# Patient Record
Sex: FEMALE | Race: WHITE | NOT HISPANIC OR LATINO | Employment: PART TIME | URBAN - METROPOLITAN AREA
[De-identification: names, ages, dates, MRNs, and addresses within clinical notes are randomized per-mention and may not be internally consistent; named-entity substitution may affect disease eponyms.]

---
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
## 2018-01-12 VITALS
HEIGHT: 62 IN | DIASTOLIC BLOOD PRESSURE: 78 MMHG | BODY MASS INDEX: 23 KG/M2 | SYSTOLIC BLOOD PRESSURE: 118 MMHG | WEIGHT: 125 LBS

## 2018-01-13 VITALS
DIASTOLIC BLOOD PRESSURE: 73 MMHG | RESPIRATION RATE: 18 BRPM | WEIGHT: 123.25 LBS | HEART RATE: 69 BPM | HEIGHT: 62 IN | BODY MASS INDEX: 22.68 KG/M2 | SYSTOLIC BLOOD PRESSURE: 112 MMHG

## 2018-01-14 VITALS
HEIGHT: 62 IN | WEIGHT: 129 LBS | SYSTOLIC BLOOD PRESSURE: 110 MMHG | BODY MASS INDEX: 23.74 KG/M2 | DIASTOLIC BLOOD PRESSURE: 60 MMHG

## 2018-01-15 NOTE — PSYCH
Psych Med Mgmt    Appearance: was calm and cooperative and good eye contact   Dressed in casual clothing, sitting comfortably in chair, cooperative, well-related  Observed mood: "Pretty good" (rating 7/10 happiness)  Observed mood: Joselito Gutierrez Appears euthymic, stable, mood-congruent  Speech: a normal rate and fluent  Thought processes: coherent/organized  Hallucinations: no hallucinations present  Thought Content: no delusions  Abnormal Thoughts: The patient has no suicidal thoughts and no homicidal thoughts  Orientation: The patient is oriented to person, place and time  Recent and Remote Memory: short term memory intact and long term memory intact  Attention Span And Concentration: concentration intact  Insight: Insight intact  Judgment: Her judgment was impaired  Muscle Strength And Tone  Normal gait and station  Language:  Within normal limits  Fund of knowledge: Patient displays  Age-appropriate  The patient is experiencing no localized pain          Treatment Recommendations: 18-1 y/o  Female, domiciled with mother, brother [de-identified]15 y/o), sister (15 y/o) in [place], parents  for 8 years (shared custody), visits with dad about 2 days/week, currently in 12th grade at [address] [place] [address] (regular education, mostly A's and B's, about 5 close friends), PPH significant for h/o anxiety symptoms, currently in outpatient therapy over past year, no past psych hospitalizations, no past suicide attempts, no h/o self-injurious behaviors, no h/o physical aggression, no significant PMH, no active substance use, presents to [place] [address] outpatient clinic on referral from PCP for anxiety with patient reporting "I have social anxiety" and mother reporting "she ran off with her boyfriend about a couple of months ago "    On assessment today, patient with some improvement in anxiety symptoms but continues to get anxious in social situations, continues to have a couple of panic attacks per month, in psychosocial context of parent-child relational problems, cannabis use, being influenced by peer behavior  No current passive or active suicidal ideation, intent, or plan  Currently, patient is not an imminent risk of harm to self or others and is appropriate for outpatient level of care at this time  Plan:  1  Social Anxiety- Will continue titration of Zoloft to 75 mg daily for anxiety symptoms  Reviewed risks/benefits and side effects, including black box warning, mother and patient consent to medication at this time  Continue individual and family psychotherapy  2  Medical- No active medical issues  F/u with PCP for on-going medical care  3  F/u with this provider in 6 weeks  Risks, Benefits And Possible Side Effects Of Medications: Risks, benefits, and possible side effects of medications explained to patient and patient verbalizes understanding  She reports normal appetite, normal energy level and normal number of sleep hours  24-1 y/o  Female, domiciled with mother, brother [de-identified]15 y/o), sister (15 y/o) in [place], parents  for 8 years (shared custody), visits with dad about 2 days/week, currently in 12th grade at [address] [place] [address] (regular education, mostly A's and B's, about 5 close friends), PPH significant for h/o anxiety symptoms, currently in outpatient therapy over past year, no past psych hospitalizations, no past suicide attempts, no h/o self-injurious behaviors, no h/o physical aggression, no significant PMH, no active substance use, presents to [place] [id number] outpatient clinic on referral from PCP for anxiety with patient reporting "I have social anxiety" and mother reporting "she ran off with her boyfriend about a couple of months ago "    On problem-focused interview:  1  Social Anxiety D/o- Patient reports some improvement in symptoms since last visit   She reports occasionally forgetting to take the medication, maybe missing about 1 day per week of the medication  Patient reports school has been going pretty well, mainly getting A's and B's, getting a 61 in math  She reports unexpected changes in her regular schedule can cause some anxiety  Patient reports having a panic attack on 3 occasions over the past month, unable to identify a trigger  Patient reports working at [employer], became upset on her birthday and started crying a bit  Patient reports her mood has been "pretty good" overall (rating 7/10 happiness)  Patient reports feeling anxious about unexpected changes in schedule because she isn't sure who she is going to walk with or talk to, worries about social aspects of situations  Patient continues to go to therapy at least on a biweekly basis to address some anxiety symptoms  Patient reports deep breathing, playing on phone, trying to distract self helps with anxious thoughts  Patient reports continues to be in a relationship with her boyfriend  Denies any recent cannabis or alcohol use  Patient reports tolerating medication well without reported side effects  Patient reports sleeping well, denies any trouble falling or staying asleep, sleeping about 7 hours/night  Reports appetite, energy is fine  Denies any passive or active suicidal ideation, intent, or plan  Patient reports things have been calm between her and her parents  Reports plans to go to community [employer] next year  Medication and therapy helping with symptoms, relationship stressors main exacerbating factor  Collateral obtained from patient's mother  Mother reports patient has been pretty well  Mother reports patient smoked cannabis on one occasion over the past 6 weeks, reports patient has been working frequently recently  Patient reports that she smoked with friends, reports that it only occurred on one occasion  Mother reports patient also lying about getting a tattoo in terms of placement of tattoo and what type of tattoo   Patient reports lying about tattoo because she thought her mom "would flip out "  DSM    Provisional Diagnosis: 1  Social Anxiety Disorder  Assessment    1  Social anxiety disorder (300 23) (F40 10)    Plan    1  From  Sertraline HCl - 50 MG Oral Tablet Take half tab daily for 1 week, then   take 1 full tab daily To Sertraline HCl - 25 MG Oral Tablet TAKE 3 TABLET Daily    Review of Systems    Constitutional: No fever, no chills, feels well, no tiredness, no recent weight gain or loss  Cardiovascular: no complaints of slow or fast heart rate, no chest pain, no palpitations  Respiratory: no complaints of shortness of breath, no wheezing, no dyspnea on exertion  Gastrointestinal: no complaints of abdominal pain, no constipation, no nausea, no diarrhea, no vomiting  Genitourinary: no complaints of dysuria, no incontinence, no pelvic pain, no urinary frequency  Musculoskeletal: no complaints of arthralgia, no myalgias, no limb pain, no joint stiffness  Integumentary: no complaints of skin rash, no itching, no dry skin  Neurological: no complaints of headache, no confusion, no numbness, no dizziness  Past Psychiatric History    Past Psychiatric History: H/o anxiety symptoms, currently in outpatient therapy over past year, no past psych hospitalizations, no past suicide attempts, no h/o self-injurious behaviors, no h/o physical aggression  Currently in outpatient therapy at [facility], about 5-6 sessions so far  Past Medication: Celexa 20 mg daily (felt ineffective)      Substance Abuse Hx    Substance Abuse History: Alcohol- Started around [age] y/o, mainly in summers and in social situations, at greatest frequency about 1x/week, drinking about 2-3 shots, last drank a couple of months ago    Cannabis- Started around 11 y/o, mainly in the summers in social situations, at greatest frequency about 3x/week, last used within past 6 weeks, most recent drug testing was negative    No cigarettes  Denies other substance use   Patient reports mother started doing drug tests over past couple of months  Active Problems    1  Social anxiety disorder (300 23) (F40 10)    Past Medical History    1  No pertinent past medical history    The active problems and past medical history were reviewed and updated today  Allergies    1  No Known Drug Allergies    Current Meds   1  Depo-Provera 150 MG/ML Intramuscular Suspension; Therapy: (Recorded:[id number]) to Recorded   2  Sertraline HCl - 50 MG Oral Tablet; Take half tab daily for 1 week, then take 1 full tab daily; Therapy: [id number] to (Nadira Ying)  Requested for: 48Rhz4799; Last   Rx:28Ohr2873 Ordered    The medication list was reviewed and updated today  Family Psych History  Denies  No FH of suicide  Social History  The social history was reviewed and updated today  Domiciled with mother and 2 siblings in [place]  Currently in 12th grade  Reports closest support is best friend, closest support in family is mother  Reports okay relationship with step-father  Denies access to firearms  History Of Phys/Sex Abuse Or Perpetration    History Of Phys/Sex Abuse or Perpetration: Denies any h/o or physical or sexual abuse  End of Encounter Meds    1  Sertraline HCl - 25 MG Oral Tablet; TAKE 3 TABLET Daily; Therapy: 15HUR0050 to (Evaluate:03Jan2017)  Requested for: 85IFO8440; Last   Rx:04Nov2016 Ordered    2  Depo-Provera 150 MG/ML Intramuscular Suspension (MedroxyPROGESTERone   Acetate); Therapy: (Recorded:29Uzx6480) to Recorded    Signatures   Electronically signed by :  YASH Bazan ; Nov 4 2016  9:49AM EST                       (Author)
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
## 2018-01-15 NOTE — PSYCH
Behavioral Health Outpatient Intake    Referred By: [address] [place] [address],[address]  Intake Questions: there are no developmental disabilities  the patient does not have a hearing impairment  the patient does not have an ICM or CTT  patient is not taking injectable psychiatric medications  Employment: The patient is not employed  full time at [employer]  Emergency Contact Information:   Emergency Contact: Morgan Bryant   Relationship to Patient: MOTHER   Phone Number: [phone]   Previous Psychiatric Treatment: She has not been previously seen by a psychiatrist     She has previously been seen by a therapist  Susan Jiang   History: no  service  She has not had combat service  She was not activated into federal active duty as a member of the [employer] or [employer]  Minor Child: MOTHER has custody of the child  there is a custody agreement  Insurance Subscriber: Morgan Bryant   : [birth year]   Employer: [employer]   Primary Insurance: [id number]    Phone number: [phone]   ID number: [id number]   Group number: [id number]     Presenting Problem (in patient's words): ANXIETY, PCP SUGGESTED SPECIALIST  Substance Abuse: NONE  Previous Treatment: The patient has not been seen here in the past      Accepted as Patient   DR Loyd Hopedale 16 @ 10     Primary Care Physician: [address]   Electronically signed by : Boo Norwood, ; Sep 19 2016 11:33AM EST                       (Author)    Electronically signed by : Boo Norwood, ; Sep 19 2016 11:34AM EST                       (Author)

## 2018-01-22 VITALS — BODY MASS INDEX: 22.82 KG/M2 | WEIGHT: 124 LBS | HEIGHT: 62 IN

## 2018-02-08 ENCOUNTER — PROCEDURE VISIT (OUTPATIENT)
Dept: OBGYN CLINIC | Facility: CLINIC | Age: 18
End: 2018-02-08
Payer: COMMERCIAL

## 2018-02-08 VITALS
DIASTOLIC BLOOD PRESSURE: 78 MMHG | BODY MASS INDEX: 26.31 KG/M2 | HEIGHT: 62 IN | WEIGHT: 143 LBS | SYSTOLIC BLOOD PRESSURE: 118 MMHG

## 2018-02-08 DIAGNOSIS — Z30.46 NEXPLANON REMOVAL: Primary | ICD-10-CM

## 2018-02-08 DIAGNOSIS — Z30.011 ENCOUNTER FOR INITIAL PRESCRIPTION OF CONTRACEPTIVE PILLS: ICD-10-CM

## 2018-02-08 PROBLEM — N92.6 IRREGULAR MENSTRUAL BLEEDING: Status: ACTIVE | Noted: 2017-12-07

## 2018-02-08 PROBLEM — S83.006A DISLOCATION OF PATELLA: Status: ACTIVE | Noted: 2017-08-09

## 2018-02-08 PROCEDURE — 11982 REMOVE DRUG IMPLANT DEVICE: CPT | Performed by: PHYSICIAN ASSISTANT

## 2018-02-08 RX ORDER — NORETHINDRONE ACETATE AND ETHINYL ESTRADIOL 1MG-20(21)
1 KIT ORAL DAILY
Qty: 28 TABLET | Refills: 2 | Status: SHIPPED | OUTPATIENT
Start: 2018-02-08 | End: 2018-04-27 | Stop reason: ALTCHOICE

## 2018-02-08 NOTE — PROGRESS NOTES
Assessment/Plan:    Encounter for initial prescription of contraceptive pills  Reviewed birth control options including OCP, NuvaRing, Patch, Depo, Nexplanon  Patient decided on OCP  Reviewed when to start, what to do if misses pill  Recommend using condoms for the 1st month on the pill, if misses more than 2 pills in the pack, if on antibiotics and for STD prevention  Reviewed common side effects of the pill including nausea, vomiting, breast pain, bloating, fatigue, mood swings, weight gain, and increased acne  Reassured side effects typically diminish in the first month or two on the pill  Reviewed clotting risk and signs and symptoms of pulmonary embolism, DVT, myocardial infarction, stroke  Will trial Junel Fe 1/20  Will RTO in 3 months for pill check  Nexplanon removal  Nexplanon removed without difficulty  Problem List Items Addressed This Visit     Encounter for initial prescription of contraceptive pills     Reviewed birth control options including OCP, NuvaRing, Patch, Depo, Nexplanon  Patient decided on OCP  Reviewed when to start, what to do if misses pill  Recommend using condoms for the 1st month on the pill, if misses more than 2 pills in the pack, if on antibiotics and for STD prevention  Reviewed common side effects of the pill including nausea, vomiting, breast pain, bloating, fatigue, mood swings, weight gain, and increased acne  Reassured side effects typically diminish in the first month or two on the pill  Reviewed clotting risk and signs and symptoms of pulmonary embolism, DVT, myocardial infarction, stroke  Will trial Junel Fe 1/20  Will RTO in 3 months for pill check  Relevant Medications    norethindrone-ethinyl estradiol (JUNEL FE 1/20) 1-20 MG-MCG per tablet    Nexplanon removal - Primary     Nexplanon removed without difficulty  Subjective:      Patient ID: Charline Baker is a 21 y o  female      HPI  22 yo seen for Nexplanon removal  Patient had Nexplanon inserted 2017  Reports persistent vaginal bleeding that had not improved with OCP  At this time would like to go back on OCP  Tolerated well in the past was just forgetting to take it  The following portions of the patient's history were reviewed and updated as appropriate:   She  has a past medical history of Pregnancy  She  does not have any pertinent problems on file  She  has a past surgical history that includes [place] tooth extraction and Induced   Her family history includes Hypertension in her father  She  reports that she has never smoked  She does not have any smokeless tobacco history on file  She reports that she does not drink alcohol or use drugs  Current Outpatient Prescriptions   Medication Sig Dispense Refill    norethindrone-ethinyl estradiol (JUNEL FE 1/20) 1-20 MG-MCG per tablet Take 1 tablet by mouth daily for 84 days 28 tablet 2     No current facility-administered medications for this visit  She has No Known Allergies       Review of Systems   Constitutional: Negative for fatigue, fever and unexpected weight change  HENT: Negative for dental problem and sinus pressure  Eyes: Negative for visual disturbance  Respiratory: Negative for cough, shortness of breath and wheezing  Cardiovascular: Negative for chest pain  Gastrointestinal: Negative for abdominal pain, blood in stool, constipation, diarrhea, nausea and vomiting  Endocrine: Negative for polydipsia  Genitourinary: Negative for difficulty urinating, dyspareunia, dysuria, frequency, hematuria, pelvic pain and urgency  Musculoskeletal: Negative for arthralgias and back pain  Neurological: Negative for dizziness, seizures, light-headedness and headaches  Psychiatric/Behavioral: Negative for suicidal ideas  The patient is not nervous/anxious  Objective:     Physical Exam   Constitutional: She is oriented to person, place, and time   She appears well-developed and well-nourished  Neck: No thyroid mass present  Cardiovascular: Normal rate, regular rhythm and normal heart sounds  Exam reveals no gallop and no friction rub  No murmur heard  Pulmonary/Chest: Effort normal and breath sounds normal  No respiratory distress  She has no wheezes  She has no rales  Abdominal: Normal appearance  Neurological: She is alert and oriented to person, place, and time  Skin: Skin is warm and dry  No rash noted  No erythema  No pallor  Psychiatric: She has a normal mood and affect   Her behavior is normal  Judgment and thought content normal      Remove and insert drug implant  Date/Time: 2/8/2018 8:15 AM  Performed by: Dov Parry  Authorized by: Dov Parry     Consent:     Consent obtained:  Verbal    Consent given by:  Patient    Procedural risks discussed:  Bleeding and infection    Patient questions answered: yes      Patient agrees, verbalizes understanding, and wants to proceed: yes    Indication:     Indication: Presence of non-biodegradable drug delivery implant    Pre-procedure:     Prepped with: alcohol 70% and povidone-iodine      Local anesthetic:  Lidocaine 1%    The site was cleaned and prepped in a sterile fashion: yes    Procedure:     Procedure:  Removal    Small stab incision was made in arm: yes      Left/right:  Left    Visualization of implant was obtained: yes      Site was closed with steri-strips and pressure bandage applied: yes

## 2018-02-08 NOTE — ASSESSMENT & PLAN NOTE
Reviewed birth control options including OCP, NuvaRing, Patch, Depo, Nexplanon  Patient decided on OCP  Reviewed when to start, what to do if misses pill  Recommend using condoms for the 1st month on the pill, if misses more than 2 pills in the pack, if on antibiotics and for STD prevention  Reviewed common side effects of the pill including nausea, vomiting, breast pain, bloating, fatigue, mood swings, weight gain, and increased acne  Reassured side effects typically diminish in the first month or two on the pill  Reviewed clotting risk and signs and symptoms of pulmonary embolism, DVT, myocardial infarction, stroke  Will trial Junel Fe 1/20  Will RTO in 3 months for pill check

## 2018-02-11 ENCOUNTER — HOSPITAL ENCOUNTER (EMERGENCY)
Facility: HOSPITAL | Age: 18
Discharge: HOME | End: 2018-02-11
Attending: EMERGENCY MEDICINE | Admitting: EMERGENCY MEDICINE
Payer: COMMERCIAL

## 2018-02-11 VITALS
BODY MASS INDEX: 25.04 KG/M2 | OXYGEN SATURATION: 100 % | RESPIRATION RATE: 16 BRPM | HEIGHT: 63 IN | HEART RATE: 86 BPM | WEIGHT: 141.31 LBS | TEMPERATURE: 98.6 F | SYSTOLIC BLOOD PRESSURE: 146 MMHG | DIASTOLIC BLOOD PRESSURE: 75 MMHG

## 2018-02-11 DIAGNOSIS — R10.9 ABDOMINAL PAIN: Primary | ICD-10-CM

## 2018-02-11 DIAGNOSIS — K58.9 IBS (IRRITABLE BOWEL SYNDROME): ICD-10-CM

## 2018-02-11 LAB
ALBUMIN SERPL BCP-MCNC: 4.6 G/DL (ref 3.5–5)
ALP SERPL-CCNC: 132 U/L (ref 46–384)
ALT SERPL W P-5'-P-CCNC: 34 U/L (ref 12–78)
ANION GAP SERPL CALCULATED.3IONS-SCNC: 12 MMOL/L (ref 4–13)
AST SERPL W P-5'-P-CCNC: 15 U/L (ref 5–45)
BASOPHILS # BLD AUTO: 0.02 THOUSANDS/ΜL (ref 0–0.1)
BASOPHILS NFR BLD AUTO: 0 % (ref 0–1)
BILIRUB SERPL-MCNC: 1 MG/DL (ref 0.2–1)
BUN SERPL-MCNC: 20 MG/DL (ref 5–25)
CALCIUM SERPL-MCNC: 9.7 MG/DL (ref 8.3–10.1)
CHLORIDE SERPL-SCNC: 104 MMOL/L (ref 100–108)
CLARITY, POC: CLEAR
CO2 SERPL-SCNC: 25 MMOL/L (ref 21–32)
COLOR, POC: YELLOW
CREAT SERPL-MCNC: 0.78 MG/DL (ref 0.6–1.3)
EOSINOPHIL # BLD AUTO: 0.08 THOUSAND/ΜL (ref 0–0.61)
EOSINOPHIL NFR BLD AUTO: 1 % (ref 0–6)
ERYTHROCYTE [DISTWIDTH] IN BLOOD BY AUTOMATED COUNT: 12.6 % (ref 11.6–15.1)
EXT BILIRUBIN, UA: NEGATIVE
EXT BLOOD URINE: NEGATIVE
EXT GLUCOSE, UA: NEGATIVE
EXT KETONES: NEGATIVE
EXT NITRITE, UA: NEGATIVE
EXT PH, UA: 6
EXT PREG TEST URINE: NEGATIVE
EXT PROTEIN, UA: NEGATIVE
EXT SPECIFIC GRAVITY, UA: 1.03
EXT UROBILINOGEN: 0.2
GFR SERPL CREATININE-BSD FRML MDRD: 111 ML/MIN/1.73SQ M
GLUCOSE SERPL-MCNC: 95 MG/DL (ref 65–140)
HCT VFR BLD AUTO: 41.2 % (ref 34.8–46.1)
HGB BLD-MCNC: 13.7 G/DL (ref 11.5–15.4)
LIPASE SERPL-CCNC: 121 U/L (ref 73–393)
LYMPHOCYTES # BLD AUTO: 3.16 THOUSANDS/ΜL (ref 0.6–4.47)
LYMPHOCYTES NFR BLD AUTO: 25 % (ref 14–44)
MCH RBC QN AUTO: 29 PG (ref 26.8–34.3)
MCHC RBC AUTO-ENTMCNC: 33.3 G/DL (ref 31.4–37.4)
MCV RBC AUTO: 87 FL (ref 82–98)
MONOCYTES # BLD AUTO: 0.65 THOUSAND/ΜL (ref 0.17–1.22)
MONOCYTES NFR BLD AUTO: 5 % (ref 4–12)
NEUTROPHILS # BLD AUTO: 8.7 THOUSANDS/ΜL (ref 1.85–7.62)
NEUTS SEG NFR BLD AUTO: 69 % (ref 43–75)
PLATELET # BLD AUTO: 299 THOUSANDS/UL (ref 149–390)
PMV BLD AUTO: 9.8 FL (ref 8.9–12.7)
POTASSIUM SERPL-SCNC: 3.5 MMOL/L (ref 3.5–5.3)
PROT SERPL-MCNC: 8.2 G/DL (ref 6.4–8.2)
RBC # BLD AUTO: 4.73 MILLION/UL (ref 3.81–5.12)
SODIUM SERPL-SCNC: 141 MMOL/L (ref 136–145)
WBC # BLD AUTO: 12.61 THOUSAND/UL (ref 4.31–10.16)
WBC # BLD EST: NEGATIVE 10*3/UL

## 2018-02-11 PROCEDURE — 81002 URINALYSIS NONAUTO W/O SCOPE: CPT | Performed by: EMERGENCY MEDICINE

## 2018-02-11 PROCEDURE — 85025 COMPLETE CBC W/AUTO DIFF WBC: CPT | Performed by: EMERGENCY MEDICINE

## 2018-02-11 PROCEDURE — 81025 URINE PREGNANCY TEST: CPT | Performed by: EMERGENCY MEDICINE

## 2018-02-11 PROCEDURE — 83690 ASSAY OF LIPASE: CPT | Performed by: EMERGENCY MEDICINE

## 2018-02-11 PROCEDURE — 80053 COMPREHEN METABOLIC PANEL: CPT | Performed by: EMERGENCY MEDICINE

## 2018-02-11 PROCEDURE — 99284 EMERGENCY DEPT VISIT MOD MDM: CPT

## 2018-02-11 PROCEDURE — 36415 COLL VENOUS BLD VENIPUNCTURE: CPT | Performed by: EMERGENCY MEDICINE

## 2018-02-11 RX ORDER — DICYCLOMINE HCL 20 MG
20 TABLET ORAL 3 TIMES DAILY
Qty: 20 TABLET | Refills: 0 | Status: SHIPPED | OUTPATIENT
Start: 2018-02-11 | End: 2018-04-27 | Stop reason: ALTCHOICE

## 2018-02-11 RX ORDER — DICYCLOMINE HCL 20 MG
20 TABLET ORAL ONCE
Status: COMPLETED | OUTPATIENT
Start: 2018-02-11 | End: 2018-02-11

## 2018-02-11 RX ADMIN — DICYCLOMINE HYDROCHLORIDE 20 MG: 20 TABLET ORAL at 22:07

## 2018-02-12 NOTE — ED NOTES
Pt  Ambulated out of dept , vss, normal gait, no acute distress       Johnathan Llamas RN  02/11/18 4436

## 2018-02-12 NOTE — DISCHARGE INSTRUCTIONS
Abdominal Pain, Ambulatory Care   GENERAL INFORMATION:   Abdominal pain  can be dull, achy, or sharp  You may have pain in one area of your abdomen, or in your entire abdomen  Your pain may be caused by constipation, food sensitivity or poisoning, infection, or a blockage  Abdominal pain can also be caused by a hernia, appendicitis, or an ulcer  The cause of your abdominal pain may be unknown  Seek immediate care for the following symptoms:   · New chest pain or shortness of breath    · Pulsing pain in your upper abdomen or lower back that suddenly becomes constant    · Pain in the right lower abdominal area that worsens with movement    · Fever over 100 4°F (38°C) or shaking chills    · Vomiting and you cannot keep food or fluids down    · Pain does not improve or gets worse over the next 8 to 12 hours    · Blood in your vomit or bowel movements, or they look black and tarry    · Skin or the whites of your eyes turn yellow    · Large amount of vaginal bleeding that is not your monthly period  Treatment for abdominal pain  may include medicine to calm your stomach, prevent vomiting, or decrease pain  Follow up with your healthcare provider as directed:  Write down your questions so you remember to ask them during your visits  CARE AGREEMENT:   You have the right to help plan your care  Learn about your health condition and how it may be treated  Discuss treatment options with your caregivers to decide what care you want to receive  You always have the right to refuse treatment  The above information is an  only  It is not intended as medical advice for individual conditions or treatments  Talk to your doctor, nurse or pharmacist before following any medical regimen to see if it is safe and effective for you  © 2014 [address] is for End User's use only and may not be sold, redistributed or otherwise used for commercial purposes   All illustrations and images included in [facility] are the copyrighted property of [employer]  or Salvador Rodriguez  Irritable Bowel Syndrome, Ambulatory Care   GENERAL INFORMATION:   Irritable bowel syndrome (IBS)  is a condition that prevents food from moving through your intestines normally  The food may move through too slowly or too quickly  This causes bloating, increased gas, constipation, or diarrhea  Signs and symptoms of IBS may come and go  Symptoms can occur a few times a week to once a month  Your symptoms may worsen after you eat a big meal or if you do not eat enough healthy foods  Common symptoms include the following:   · Abdominal pain that disappears after you have a bowel movement    · Abdominal camps that are worse after you eat    · Gas    · Bloated abdomen    · Diarrhea, constipation, or both     · Feeling like you need to have a bowel movement after you just had one  Seek immediate care for the following symptoms:   · Severe abdominal pain    · Dark or bloody bowel movements  Treatment for IBS  may include diarrhea medicine or muscle relaxers  You may also need a bowel movement softener or laxative  Manage your symptoms:   · Keep a diary  of everything you eat and drink, and your symptoms, for 3 weeks  · Eat a variety of healthy foods  Healthy foods include fruits, vegetables, whole-grain breads, low-fat dairy products, beans, lean meats, and fish  Ask if you need to be on a special diet  · Drink liquids as directed  Ask how much liquid to drink each day and which liquids are best for you  Liquids will help prevent dehydration and soften your bowel movement  Follow up with your healthcare provider as directed:  Write down your questions so you remember to ask them during your visits  CARE AGREEMENT:   You have the right to help plan your care  Learn about your health condition and how it may be treated  Discuss treatment options with your caregivers to decide what care you want to receive   You always have the right to refuse treatment  The above information is an  only  It is not intended as medical advice for individual conditions or treatments  Talk to your doctor, nurse or pharmacist before following any medical regimen to see if it is safe and effective for you  © 2014 [address] is for End User's use only and may not be sold, redistributed or otherwise used for commercial purposes  All illustrations and images included in CareNotes® are the copyrighted property of A D A M [employer] Inc  or [address]

## 2018-02-12 NOTE — ED PROVIDER NOTES
History  Chief Complaint   Patient presents with    Abdominal Pain     Pt states she had her nexplanon implantable birth control removed 4 days and has had intermittent cramping abdominal pain since  Started out in upper abdomen, now located lower  Denies any N/V/D  Has tried OTC meds including ibuprofen and has taken stool softeners / laxatives without relief of pain  No vaginal bleeding or other symptoms  Patient presents to the emergency department for evaluation of generalized abdominal pain described as a crampy intermittent discomfort that began 4 days ago on the same day that she had her implantable birth control device removed  She does have a history of IBS  She denies nausea vomiting diarrhea  She denies vaginal discharge bleeding or urinary symptoms  She denies back pain chest pain or sob  She denies fever chills  Over-the-counter laxatives and ibuprofen has not worked  She did call her OBGYN who does not think her discomfort is caused by the removal of her contraceptive device  Prior to Admission Medications   Prescriptions Last Dose Informant Patient Reported? Taking?   norethindrone-ethinyl estradiol (Nilda Condon ) 1-20 MG-MCG per tablet   No No   Sig: Take 1 tablet by mouth daily for 84 days      Facility-Administered Medications: None       Past Medical History:   Diagnosis Date    Pregnancy        Past Surgical History:   Procedure Laterality Date    INDUCED       WISDOM TOOTH EXTRACTION         Family History   Problem Relation Age of Onset    Hypertension Father      I have reviewed and agree with the history as documented  Social History   Substance Use Topics    Smoking status: Never Smoker    Smokeless tobacco: Never Used    Alcohol use No        Review of Systems   Constitutional: Negative  Negative for activity change, appetite change, chills, diaphoresis, fatigue and fever  HENT: Negative    Negative for congestion, trouble swallowing and voice change  Eyes: Negative  Negative for photophobia and visual disturbance  Respiratory: Negative  Negative for cough, chest tightness, shortness of breath, wheezing and stridor  Cardiovascular: Negative  Negative for chest pain, palpitations and leg swelling  Gastrointestinal: Positive for abdominal pain  Negative for abdominal distention, anal bleeding, blood in stool, constipation, diarrhea, nausea and rectal pain  Endocrine: Negative  Genitourinary: Negative  Negative for decreased urine volume, difficulty urinating, dysuria, flank pain, frequency, genital sores, hematuria, menstrual problem, pelvic pain, urgency, vaginal bleeding, vaginal discharge and vaginal pain  Musculoskeletal: Negative  Negative for back pain, neck pain and neck stiffness  Skin: Negative  Negative for rash and wound  Allergic/Immunologic: Negative  Neurological: Negative  Negative for dizziness, tremors, seizures, syncope, facial asymmetry, speech difficulty, weakness, light-headedness, numbness and headaches  Hematological: Negative  Does not bruise/bleed easily  Psychiatric/Behavioral: Negative  Physical Exam  ED Triage Vitals [02/11/18 2003]   Temperature Pulse Respirations Blood Pressure SpO2   98 6 °F (37 °C) 86 16 146/75 100 %      Temp Source Heart Rate Source Patient Position - Orthostatic VS BP Location FiO2 (%)   Oral Monitor Sitting Right arm --      Pain Score       6           Orthostatic Vital Signs  Vitals:    02/11/18 2003   BP: 146/75   Pulse: 86   Patient Position - Orthostatic VS: Sitting       Physical Exam   Constitutional: She is oriented to person, place, and time  She appears well-developed and well-nourished  Nontoxic appearance without respiratory distress  Patient looks comfortable sitting upright in the stretcher  HENT:   Head: Normocephalic and atraumatic     Mouth/Throat: Oropharynx is clear and moist    Eyes: Conjunctivae and EOM are normal  Pupils are equal, round, and reactive to light  Neck: Normal range of motion  Neck supple  Cardiovascular: Normal rate, regular rhythm, normal heart sounds and intact distal pulses  Pulmonary/Chest: Breath sounds normal  No respiratory distress  She has no wheezes  She has no rales  She exhibits no tenderness  Abdominal: Soft  Bowel sounds are normal  She exhibits no distension and no mass  There is no tenderness  There is no rebound and no guarding  No hernia  Mild generalized tenderness without peritoneal signs  Musculoskeletal: Normal range of motion  She exhibits no edema, tenderness or deformity  Neurological: She is alert and oriented to person, place, and time  She has normal reflexes  She displays normal reflexes  No cranial nerve deficit or sensory deficit  She exhibits normal muscle tone  Coordination normal    Skin: Skin is warm and dry  No rash noted  No erythema  No pallor  Psychiatric: She has a normal mood and affect  Her behavior is normal  Judgment and thought content normal    Nursing note and vitals reviewed        ED Medications  Medications   dicyclomine (BENTYL) tablet 20 mg (20 mg Oral Given 2/11/18 2207)       Diagnostic Studies  Results Reviewed     Procedure Component Value Units Date/Time    Comprehensive metabolic panel [[id number]] Collected:  02/11/18 2104    Lab Status:  Final result Specimen:  Blood from Arm, Left Updated:  02/11/18 2128     Sodium 141 mmol/L      Potassium 3 5 mmol/L      Chloride 104 mmol/L      CO2 25 mmol/L      Anion Gap 12 mmol/L      BUN 20 mg/dL      Creatinine 0 78 mg/dL      Glucose 95 mg/dL      Calcium 9 7 mg/dL      AST 15 U/L      ALT 34 U/L      Alkaline Phosphatase 132 U/L      Total Protein 8 2 g/dL      Albumin 4 6 g/dL      Total Bilirubin 1 00 mg/dL      eGFR 111 ml/min/1 73sq m     Narrative:         National Kidney Disease Education Program recommendations are as follows:  GFR calculation is accurate only with a steady state creatinine  Chronic Kidney disease less than 60 ml/min/1 73 sq  meters  Kidney failure less than 15 ml/min/1 73 sq  meters      Lipase [[id number]]  (Normal) Collected:  02/11/18 2104    Lab Status:  Final result Specimen:  Blood from Arm, Left Updated:  02/11/18 2128     Lipase 121 u/L     CBC and differential [[id number]]  (Abnormal) Collected:  02/11/18 2104    Lab Status:  Final result Specimen:  Blood from Arm, Left Updated:  02/11/18 2111     WBC 12 61 (H) Thousand/uL      RBC 4 73 Million/uL      Hemoglobin 13 7 g/dL      Hematocrit 41 2 %      MCV 87 fL      MCH 29 0 pg      MCHC 33 3 g/dL      RDW 12 6 %      MPV 9 8 fL      Platelets 337 Thousands/uL      Neutrophils Relative 69 %      Lymphocytes Relative 25 %      Monocytes Relative 5 %      Eosinophils Relative 1 %      Basophils Relative 0 %      Neutrophils Absolute 8 70 (H) Thousands/µL      Lymphocytes Absolute 3 16 Thousands/µL      Monocytes Absolute 0 65 Thousand/µL      Eosinophils Absolute 0 08 Thousand/µL      Basophils Absolute 0 02 Thousands/µL     POCT urinalysis dipstick [[id number]]  (Normal) Resulted:  02/11/18 2047    Lab Status:  Final result Updated:  02/11/18 2048     Color, UA Yellow     Clarity, UA Clear     EXT Glucose, UA Negative     EXT Bilirubin, UA (Ref: Negative) Negative     EXT Ketones, UA (Ref: Negative) Negative     EXT Spec Grav, UA 1 030     EXT Blood, UA (Ref: Negative) Negative     EXT pH, UA 6 0     EXT Protein, UA (Ref: Negative) Negative     EXT Urobilinogen, UA (Ref: 0 2- 1 0) 0 2     EXT Leukocytes, UA (Ref: Negative) Negative     EXT Nitrite, UA (Ref: Negative) Negative    POCT pregnancy, urine [[id number]]  (Normal) Resulted:  02/11/18 2047    Lab Status:  Final result Updated:  02/11/18 2047     EXT PREG TEST UR (Ref: Negative) Negative                 No orders to display              Procedures  Procedures       Phone Contacts  ED Phone Contact    ED Course  ED Course as of Feb 11 2231   Sun Feb 11, 2018 2149 Patient is stable for discharge  I suspected irritable bowel presentation  Will discharge her with Bentyl  Discussed signs and symptoms that would require return to the emergency department  [facility]  CritCare Time    Disposition  Final diagnoses:   Abdominal pain   IBS (irritable bowel syndrome)     Time reflects when diagnosis was documented in both MDM as applicable and the Disposition within this note     Time User Action Codes Description Comment    2/11/2018  9:54 PM Charles Bueno Add [R10 9] Abdominal pain     2/11/2018  9:54 PM Charles Bueno Add [K58 9] IBS (irritable bowel syndrome)       ED Disposition     None      Follow-up Information     Follow up With Specialties Details Why [facility] physician  Schedule an appointment as soon as possible for a visit          Patient's Medications   Discharge Prescriptions    DICYCLOMINE (BENTYL) 20 MG TABLET    Take 1 tablet (20 mg total) by mouth 3 (three) times a day       Start Date: 2/11/2018 End Date: --       Order Dose: 20 mg       Quantity: 20 tablet    Refills: 0     No discharge procedures on file      ED Provider  Electronically Signed by           Ba Gutierrez MD  02/11/18 2784       Ba Gutierrez MD  02/11/18 9046

## 2018-03-07 NOTE — PSYCH
Message  Patient No Show Letter - Behavioral Health:     Date: 12/15/2016     Dear Matty Cooley,     We missed seeing you for a scheduled appointment at [facility] on 12-14-16 at 9:30am with Dr Veronica Godoy  Our goal is to offer the best possible care to our patients, so we are concerned when you are unable to keep a scheduled appointment  Please call us at [phone] so that we can reschedule the appointment for a date and time that will work for you  We understand that circumstances may arise which make it impossible for you to keep a scheduled appointment  Should this happen in the future, please call us as soon as you know the appointment will be missed  The earlier you let us know, the more likely we can offer your scheduled appointment time to another patient  We hope to hear from you soon       Sincerely,   Dr Isma Joe   Electronically signed by : Kate Smith, ; Dec 15 2016  7:24AM EST                       (Author)

## 2018-03-07 NOTE — PROGRESS NOTES
Education  [employer] Education 1st Trimester:   First Trimester Education provided:   The patient is not planning on breastfeeding     Prenatal education provided by: Percy Fernandez MA      Signatures   Electronically signed by : Percy Fernandez, ; Feb 20 2017 10:26AM EST                       (Author)

## 2018-03-07 NOTE — PSYCH
Discharge Summary  Psychiatric Therapist Discharge Summary [facility]:   Discharge Summary: Admission Date: 9/23/2016 Discharge Date: 11/14/2017  Discharge Diagnosis:    Axis I: Social Anxiety Disorder  Prognosis at time of discharge is fair  Presenting Problem/Pertinent findings:  18-1 y/o  Female, domiciled with mother, brother [de-identified]15 y/o), sister (15 y/o) in [place], parents  for 8 years (shared custody), visits with dad about 2 days/week, currently in 12th grade at [address] (regular education, mostly A's and B's, about 5 close friends), PPH significant for h/o anxiety symptoms, currently in outpatient therapy over past year, no past psych hospitalizations, no past suicide attempts, no h/o self-injurious behaviors, no h/o physical aggression, no significant PMH, no active substance use, presents to [place] [address] outpatient clinic on referral from PCP for anxiety with patient reporting "I have social anxiety" and mother reporting "she ran off with her boyfriend about a couple of months ago "  Course of treatment includes  psychiatric evaluation and medication management  Treatment Progress: Patient was seen by this provider for initial psychiatric evaluation and one follow-up medication management visit  She was started on Zoloft titrated up to 75 mg daily with some improvement in anxiety symptoms  Patient did not follow-up with provider following this visit  The consumer achieved some of their goals  Criteria for Discharge: The patient has   Withdrew from treatment  Aftercare recommendations include:  F/u with PCP for continued concerns about anxiety  Discharge Medications include: Zoloft 75 mg daily       Prognosis [facility]:   Prognosis: Fair  Active Problems    1  Dislocation of patella (836 3) (S83 006A)   2  Family planning counseling (V25 09) (Z30 09)   3  Nexplanon insertion (V25 5) (Z30 017)   4   Social anxiety disorder (300 23) (F40 10)    Past Medical History    1  History of pregnancy (V13 29)   2  No pertinent past medical history   3  History of Supervision of normal pregnancy (V22 1) (Z34 90)    Social History    · Denied: History of Drug use   · Denied: History of Exercise habits   · Denied: History of H/O domestic violence   · Never a smoker   · No alcohol use    Allergies    1  No Known Drug Allergies    Current Meds   1  No Reported Medications Recorded    Signatures   Electronically signed by :  YASH Pedersen ; Nov 14 2017  1:58PM EST                       (Author)

## 2018-03-28 ENCOUNTER — APPOINTMENT (EMERGENCY)
Dept: ULTRASOUND IMAGING | Facility: HOSPITAL | Age: 18
End: 2018-03-28
Payer: COMMERCIAL

## 2018-03-28 ENCOUNTER — HOSPITAL ENCOUNTER (EMERGENCY)
Facility: HOSPITAL | Age: 18
Discharge: HOME | End: 2018-03-28
Attending: EMERGENCY MEDICINE
Payer: COMMERCIAL

## 2018-03-28 VITALS
OXYGEN SATURATION: 100 % | RESPIRATION RATE: 18 BRPM | SYSTOLIC BLOOD PRESSURE: 158 MMHG | DIASTOLIC BLOOD PRESSURE: 72 MMHG | TEMPERATURE: 98.6 F | HEIGHT: 63 IN | WEIGHT: 148.5 LBS | BODY MASS INDEX: 26.31 KG/M2 | HEART RATE: 91 BPM

## 2018-03-28 DIAGNOSIS — O20.0 THREATENED MISCARRIAGE: Primary | ICD-10-CM

## 2018-03-28 LAB
B-HCG SERPL-ACNC: ABNORMAL MIU/ML
EXT PREG TEST URINE: POSITIVE

## 2018-03-28 PROCEDURE — 84702 CHORIONIC GONADOTROPIN TEST: CPT | Performed by: EMERGENCY MEDICINE

## 2018-03-28 PROCEDURE — 81025 URINE PREGNANCY TEST: CPT | Performed by: EMERGENCY MEDICINE

## 2018-03-28 PROCEDURE — 99284 EMERGENCY DEPT VISIT MOD MDM: CPT

## 2018-03-28 PROCEDURE — 76801 OB US < 14 WKS SINGLE FETUS: CPT

## 2018-03-28 PROCEDURE — 36415 COLL VENOUS BLD VENIPUNCTURE: CPT | Performed by: EMERGENCY MEDICINE

## 2018-04-27 ENCOUNTER — INITIAL PRENATAL (OUTPATIENT)
Dept: OBGYN CLINIC | Facility: CLINIC | Age: 18
End: 2018-04-27

## 2018-04-27 VITALS
BODY MASS INDEX: 24.8 KG/M2 | WEIGHT: 140 LBS | HEIGHT: 63 IN | DIASTOLIC BLOOD PRESSURE: 80 MMHG | SYSTOLIC BLOOD PRESSURE: 132 MMHG

## 2018-04-27 DIAGNOSIS — Z34.01 ENCOUNTER FOR SUPERVISION OF NORMAL FIRST PREGNANCY IN FIRST TRIMESTER: Primary | ICD-10-CM

## 2018-04-27 PROCEDURE — PNV: Performed by: PHYSICIAN ASSISTANT

## 2018-04-27 PROCEDURE — PNV: Performed by: NURSE PRACTITIONER

## 2018-04-27 NOTE — PROGRESS NOTES
NOB: Cx done, consents signed, sequential screen discussed  Information for [facility] given  Reports nausea, vomiting 3x's a week  Reviewed small frequent meals, vitamin B6, mike, unisom  Reports occ headaches, reviewed tylenol, hydration, and rest   No FM, cramping, VB, LOF, edema, domestic violence, or smoking  Tolerating PNV  Patient had Nexplanon removed in 2/2018 and never started OCP  Unsure of LMP  Had ultrasound done at ED due to a small amount of vaginal spotting  Due date based on that ultrasound is 11/12/2018 Has not had bleeding since  TAUS: Del Cid IUP at 12w1d based on CRL of 5 5cm  (+)FHR of 155  OB panel lab work given including CF testing  BFA done today, patient is planning on breastfeeding  Continue routine prenatal care  Return to office in 4 weeks for ob check

## 2018-04-30 LAB
DEPRECATED C TRACH RRNA XXX QL PRB: NOT DETECTED
N GONORRHOEA DNA UR QL NAA+PROBE: NOT DETECTED
SL AMB GENITAL CULTURE: NORMAL
T VAGINALIS RRNA SPEC QL NAA+PROBE: NOT DETECTED

## 2018-05-04 ENCOUNTER — ROUTINE PRENATAL (OUTPATIENT)
Dept: PERINATAL CARE | Facility: CLINIC | Age: 18
End: 2018-05-04
Payer: COMMERCIAL

## 2018-05-04 VITALS
DIASTOLIC BLOOD PRESSURE: 65 MMHG | WEIGHT: 139.6 LBS | SYSTOLIC BLOOD PRESSURE: 108 MMHG | HEART RATE: 81 BPM | BODY MASS INDEX: 24.73 KG/M2 | HEIGHT: 63 IN

## 2018-05-04 DIAGNOSIS — Z3A.12 12 WEEKS GESTATION OF PREGNANCY: ICD-10-CM

## 2018-05-04 DIAGNOSIS — Z36.82 ENCOUNTER FOR ANTENATAL SCREENING FOR NUCHAL TRANSLUCENCY: Primary | ICD-10-CM

## 2018-05-04 DIAGNOSIS — Z34.01 ENCOUNTER FOR SUPERVISION OF NORMAL FIRST PREGNANCY IN FIRST TRIMESTER: ICD-10-CM

## 2018-05-04 PROCEDURE — 76813 OB US NUCHAL MEAS 1 GEST: CPT | Performed by: OBSTETRICS & GYNECOLOGY

## 2018-05-04 PROCEDURE — 99201 PR OFFICE OUTPATIENT NEW 10 MINUTES: CPT | Performed by: OBSTETRICS & GYNECOLOGY

## 2018-05-04 NOTE — LETTER
May 5, 2018     Boris Browning MD  [address]    Patient: Ko Mckinney   YOB: 2000   Date of Visit: 5/4/2018       Dear Dr Jessica Alexander: Thank you for referring Percy Zhong to me for evaluation  Below are my notes for this consultation  If you have questions, please do not hesitate to call me  I look forward to following your patient along with you  Sincerely,        Adonis Washburn MD        CC: No Recipients  Adonis Washburn MD  5/4/2018  9:16 AM  Sign at close encounter  Please refer to the [facility] ultrasound report in Ob Procedures for additional information regarding the visit to the [facility]  today

## 2018-05-04 NOTE — PROGRESS NOTES
Please refer to the [facility] ultrasound report in Ob Procedures for additional information regarding the visit to the [facility]  today

## 2018-05-07 ENCOUNTER — TELEPHONE (OUTPATIENT)
Dept: OBGYN CLINIC | Facility: CLINIC | Age: 18
End: 2018-05-07

## 2018-05-08 ENCOUNTER — ROUTINE PRENATAL (OUTPATIENT)
Dept: OBGYN CLINIC | Facility: CLINIC | Age: 18
End: 2018-05-08
Payer: COMMERCIAL

## 2018-05-08 VITALS — BODY MASS INDEX: 24.98 KG/M2 | SYSTOLIC BLOOD PRESSURE: 108 MMHG | DIASTOLIC BLOOD PRESSURE: 68 MMHG | WEIGHT: 141 LBS

## 2018-05-08 DIAGNOSIS — R30.0 DYSURIA: Primary | ICD-10-CM

## 2018-05-08 PROCEDURE — 99213 OFFICE O/P EST LOW 20 MIN: CPT | Performed by: PHYSICIAN ASSISTANT

## 2018-05-08 RX ORDER — NITROFURANTOIN 25; 75 MG/1; MG/1
100 CAPSULE ORAL 2 TIMES DAILY
Qty: 14 CAPSULE | Refills: 0 | Status: ON HOLD | OUTPATIENT
Start: 2018-05-08 | End: 2018-06-07 | Stop reason: ALTCHOICE

## 2018-05-08 NOTE — PROGRESS NOTES
Problem Visit: Patient has had burning on urination x 2 days  Increased frequency  No urgency, hesitancy, fever, chills, pelvic pain, flank pain, vaginal discharge, itching or odors  Reports some nausea, vomiting improved  Headaches improved  No FM, cramping, VB, LOF, edema, domestic violence, or smoking  Tolerating PNV  Physical Exam   Constitutional: She is oriented to person, place, and time  She appears well-developed and well-nourished  Neck: Neck supple  No thyroid mass present  Cardiovascular: Normal rate, regular rhythm and normal heart sounds  Exam reveals no gallop and no friction rub  No murmur heard  Pulmonary/Chest: Effort normal and breath sounds normal  No respiratory distress  She has no wheezes  She has no rales  Abdominal: Soft  Normal appearance and bowel sounds are normal  She exhibits no distension and no mass  There is no tenderness  There is no rebound and no guarding  Genitourinary: There is no rash, tenderness, lesion or injury on the right labia  There is no rash, tenderness, lesion or injury on the left labia  Uterus is not deviated, not enlarged and not tender  Cervix exhibits no motion tenderness, no discharge and no friability  Right adnexum displays no mass, no tenderness and no fullness  Left adnexum displays no mass, no tenderness and no fullness  No erythema, tenderness or bleeding in the vagina  No signs of injury around the vagina  No vaginal discharge found  Lymphadenopathy:        Right: No inguinal and no supraclavicular adenopathy present  Left: No inguinal and no supraclavicular adenopathy present  Neurological: She is alert and oriented to person, place, and time  Skin: Skin is warm and dry  No rash noted  No erythema  No pallor  Psychiatric: She has a normal mood and affect  Her behavior is normal  Judgment and thought content normal    Urine dip negative  Due to symptoms will start treat for UTI and send urine out for culture   Macrobid 100mg BID x 7 days sent to pharmacy  Continue routine prenatal care  Return to office scheduled ob check

## 2018-05-10 LAB — BACTERIA UR CULT: NORMAL

## 2018-05-14 ENCOUNTER — TELEPHONE (OUTPATIENT)
Dept: PERINATAL CARE | Facility: CLINIC | Age: 18
End: 2018-05-14

## 2018-05-17 ENCOUNTER — TELEPHONE (OUTPATIENT)
Dept: OBGYN CLINIC | Facility: CLINIC | Age: 18
End: 2018-05-17

## 2018-05-17 ENCOUNTER — ROUTINE PRENATAL (OUTPATIENT)
Dept: OBGYN CLINIC | Facility: CLINIC | Age: 18
End: 2018-05-17

## 2018-05-17 VITALS — BODY MASS INDEX: 24.98 KG/M2 | SYSTOLIC BLOOD PRESSURE: 110 MMHG | DIASTOLIC BLOOD PRESSURE: 72 MMHG | WEIGHT: 141 LBS

## 2018-05-17 DIAGNOSIS — Z34.02 ENCOUNTER FOR SUPERVISION OF NORMAL FIRST PREGNANCY IN SECOND TRIMESTER: ICD-10-CM

## 2018-05-17 DIAGNOSIS — O21.9 NAUSEA AND VOMITING IN PREGNANCY: Primary | ICD-10-CM

## 2018-05-17 PROCEDURE — PNV: Performed by: NURSE PRACTITIONER

## 2018-05-17 RX ORDER — DOXYLAMINE SUCCINATE AND PYRIDOXINE HYDROCHLORIDE, DELAYED RELEASE TABLETS 10 MG/10 MG 10; 10 MG/1; MG/1
TABLET, DELAYED RELEASE ORAL
Qty: 90 TABLET | Refills: 0 | Status: SHIPPED | OUTPATIENT
Start: 2018-05-17 | End: 2018-07-02 | Stop reason: SDUPTHER

## 2018-05-17 NOTE — PROGRESS NOTES
Problem Visit: Pt presents today for complaints of nausea/vomiting  Pt states she has had nausea throughout her pregnancy thus far  She has tried everything from B6, Unisom, Colleen tea, Colleen candy, colleen chews, preggo pops, all with no relief  She was tolerating food and fluids well with episodes of vomiting being limited to twice weekly  Last week she began to have more increased nausea and started having vomiting  She has been vomiting daily/several times a day  Pt states she has been able to keep fluids down but has only been able to tolerate crackers or toast at this point  States + sore throat, + nasal congestion, + fever last night of 101 F  Denies any fevers today  States brother has been sick recently  Reviewed small frequent meals, bland meals  Drink, drink drink  If unable to tolerate fluids call us back may need to go to hospital for hydration  Advised continue to assess for viral symptoms  This may be more viral than pregnancy related  Follow up with PCP if continues to have fevers  Sample of Diclegis given with instructions on how to take will send script to pharmacy  Denies any HA, Cramping, VB, LOF, Edema, Domestic Violence, Smoking  Does not feel FM, normal at this gestation  Tolerating PNV   RTO for ob check

## 2018-05-21 LAB
ABO GROUP BLD: NORMAL
APPEARANCE UR: CLEAR
BACTERIA UR QL AUTO: ABNORMAL /HPF
BASOPHILS # BLD AUTO: 7 CELLS/UL (ref 0–200)
BASOPHILS NFR BLD AUTO: 0.1 %
BILIRUB UR QL STRIP: NEGATIVE
BLD GP AB SCN SERPL QL: NORMAL
COLOR UR: YELLOW
EOSINOPHIL # BLD AUTO: 28 CELLS/UL (ref 15–500)
EOSINOPHIL NFR BLD AUTO: 0.4 %
ERYTHROCYTE [DISTWIDTH] IN BLOOD BY AUTOMATED COUNT: 13.4 % (ref 11–15)
GLUCOSE UR QL STRIP: NEGATIVE
HBV SURFACE AG SERPL QL IA: NORMAL
HCT VFR BLD AUTO: 37.3 % (ref 35–45)
HCV AB S/CO SERPL IA: 0.01
HCV AB SERPL QL IA: NORMAL
HGB BLD-MCNC: 12.3 G/DL (ref 11.7–15.5)
HGB UR QL STRIP: NEGATIVE
HIV 1+2 AB+HIV1 P24 AG SERPL QL IA: NORMAL
HYALINE CASTS #/AREA URNS LPF: ABNORMAL /LPF
KETONES UR QL STRIP: NEGATIVE
LEUKOCYTE ESTERASE UR QL STRIP: ABNORMAL
LYMPHOCYTES # BLD AUTO: 1750 CELLS/UL (ref 850–3900)
LYMPHOCYTES NFR BLD AUTO: 25 %
MCH RBC QN AUTO: 30.4 PG (ref 27–33)
MCHC RBC AUTO-ENTMCNC: 33 G/DL (ref 32–36)
MCV RBC AUTO: 92.1 FL (ref 80–100)
MONOCYTES # BLD AUTO: 616 CELLS/UL (ref 200–950)
MONOCYTES NFR BLD AUTO: 8.8 %
NEUTROPHILS # BLD AUTO: 4599 CELLS/UL (ref 1500–7800)
NEUTROPHILS NFR BLD AUTO: 65.7 %
NITRITE UR QL STRIP: NEGATIVE
PH UR STRIP: 6.5 [PH] (ref 5–8)
PLATELET # BLD AUTO: 272 THOUSAND/UL (ref 140–400)
PMV BLD REES-ECKER: 10.8 FL (ref 7.5–12.5)
PROT UR QL STRIP: NEGATIVE
RBC # BLD AUTO: 4.05 MILLION/UL (ref 3.8–5.1)
RBC #/AREA URNS HPF: ABNORMAL /HPF
RH BLD: NORMAL
RPR SER QL: NORMAL
RUBV IGG SERPL IA-ACNC: 0.95 INDEX
SP GR UR STRIP: 1.01 (ref 1–1.03)
SQUAMOUS #/AREA URNS HPF: ABNORMAL /HPF
WBC # BLD AUTO: 7 THOUSAND/UL (ref 3.8–10.8)
WBC #/AREA URNS HPF: ABNORMAL /HPF

## 2018-05-25 ENCOUNTER — ROUTINE PRENATAL (OUTPATIENT)
Dept: OBGYN CLINIC | Facility: CLINIC | Age: 18
End: 2018-05-25

## 2018-05-25 VITALS
SYSTOLIC BLOOD PRESSURE: 108 MMHG | BODY MASS INDEX: 26.13 KG/M2 | HEIGHT: 62 IN | WEIGHT: 142 LBS | DIASTOLIC BLOOD PRESSURE: 70 MMHG

## 2018-05-25 DIAGNOSIS — Z34.02 ENCOUNTER FOR SUPERVISION OF NORMAL FIRST PREGNANCY IN SECOND TRIMESTER: Primary | ICD-10-CM

## 2018-05-25 PROBLEM — N92.6 IRREGULAR MENSTRUAL BLEEDING: Status: RESOLVED | Noted: 2017-12-07 | Resolved: 2018-05-25

## 2018-05-25 PROBLEM — Z30.011 ENCOUNTER FOR INITIAL PRESCRIPTION OF CONTRACEPTIVE PILLS: Status: RESOLVED | Noted: 2018-02-08 | Resolved: 2018-05-25

## 2018-05-25 PROBLEM — R30.0 DYSURIA: Status: RESOLVED | Noted: 2018-05-08 | Resolved: 2018-05-25

## 2018-05-25 PROBLEM — Z30.46 NEXPLANON REMOVAL: Status: RESOLVED | Noted: 2018-02-08 | Resolved: 2018-05-25

## 2018-05-25 PROCEDURE — PNV: Performed by: OBSTETRICS & GYNECOLOGY

## 2018-05-25 NOTE — PROGRESS NOTES
Visit:  Feeling better with the diclegis - tolerating PNV - has f/u US at [facility] scheduled -

## 2018-06-07 ENCOUNTER — HOSPITAL ENCOUNTER (OUTPATIENT)
Facility: HOSPITAL | Age: 18
End: 2018-06-07
Attending: OBSTETRICS & GYNECOLOGY | Admitting: OBSTETRICS & GYNECOLOGY
Payer: COMMERCIAL

## 2018-06-07 ENCOUNTER — HOSPITAL ENCOUNTER (OUTPATIENT)
Facility: HOSPITAL | Age: 18
Discharge: HOME | End: 2018-06-07
Attending: OBSTETRICS & GYNECOLOGY | Admitting: OBSTETRICS & GYNECOLOGY
Payer: COMMERCIAL

## 2018-06-07 ENCOUNTER — TELEPHONE (OUTPATIENT)
Dept: OBGYN CLINIC | Facility: CLINIC | Age: 18
End: 2018-06-07

## 2018-06-07 VITALS
SYSTOLIC BLOOD PRESSURE: 123 MMHG | WEIGHT: 142 LBS | TEMPERATURE: 98.5 F | OXYGEN SATURATION: 99 % | HEART RATE: 96 BPM | BODY MASS INDEX: 26.13 KG/M2 | DIASTOLIC BLOOD PRESSURE: 67 MMHG | HEIGHT: 62 IN | RESPIRATION RATE: 18 BRPM

## 2018-06-07 PROBLEM — Z3A.17 17 WEEKS GESTATION OF PREGNANCY: Status: ACTIVE | Noted: 2018-06-07
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
## 2018-06-07 PROCEDURE — G0463 HOSPITAL OUTPT CLINIC VISIT: HCPCS

## 2018-06-07 PROCEDURE — 99213 OFFICE O/P EST LOW 20 MIN: CPT

## 2018-06-07 NOTE — PROGRESS NOTES
Triage Note - OB  Humberto Late 21 y o  female MRN: [id number]  Unit/Bed#: LD Triage  Encounter: [id number]    Chief Complaint:   MUNIR: Estimated Date of Delivery: 18    HPI: Patient is a  at 17w3d here with complaints falling down stairs at work  She reports she tripped and fell down 12 steps on her behind and back  She denies hitting her abdomen  She reports having a small amount of cramping but denies any tightening or sharp pain in the abdomen  She denies any loss of fluid or vaginal bleeding  She reports she called into Dr Yumiko Tyson office and was told to present for assessment  She denies any back or bottom pain, bruising, cuts or scrapes  She denies issues with range of motion  Vitals:   /67 (BP Location: Right arm)   Pulse 96   Temp 98 5 °F (36 9 °C) (Oral)   Resp 18   Ht 5' 2" (1 575 m)   Wt 64 4 kg (142 lb)   LMP  (LMP Unknown)   SpO2 99%   BMI 25 97 kg/m²   Body mass index is 25 97 kg/m²  Physical Exam  GEN:  Appears common in no acute distress  ABD:  Abdomen soft, nontender, no ecchymosis noted, gravid uterus noted approximately 3 cm below umbilicus  Back: No tenderness, no ecchymoses or erythema noted    Labs: No results found for this or any previous visit (from the past 24 hour(s))  Imaging:  Transabdominal ultrasound  Variable position  Good fetal movement noted with gross and fine fetal movement seen by myself and patient  Placenta is anterior and appears to be low lying      Lab, Imaging and other studies: I have personally reviewed pertinent reports      A/P:  21year-old  010 at 17 weeks and 3 days following trip and fall downstairs  1) Fetal status via transabdominal ultrasound appears reassuring  2) Reassurance given to patient  3) Discharge instructions given to patient - instructed to call with any vaginal bleeding or worsening abdominal pain  4) D/W Dr Morgan Garnica MD  2018  4:58 PM

## 2018-06-15 LAB
COLLECT DATE: NORMAL
INTEGRATED SCN PATIENT-IMP: NORMAL
PHYSICIAN NPI: NORMAL
SL AMB REFERRING PHYSICIAN NAME: NORMAL
SL AMB REFERRING PHYSICIAN PHONE: NORMAL
SL AMB SPECIMEN # FROM PART 1: NORMAL

## 2018-06-18 ENCOUNTER — TELEPHONE (OUTPATIENT)
Dept: PERINATAL CARE | Facility: CLINIC | Age: 18
End: 2018-06-18

## 2018-06-18 NOTE — TELEPHONE ENCOUNTER
Patient had been mailed a [facility]/IG TRF and letter sent stated to use [facility] for Part 2 collection   Patient  states she wants to  new lab slip and will have blood drawn at [facility]  Explained last day eligible for screening is June 30th  New TRF completed for pt  @ [facility] office  Pt aware repeat screening may not be covered  Phone call to [facility] lab, spoke with Jemima Watson  Pt had blood draw at [place] [address]  Unable to get through to this number  VMM to [place] and requested to take part 2 collection off pt's bill as draw station should have known that a part 2 lab slip needs to match with Part 1

## 2018-06-18 NOTE — TELEPHONE ENCOUNTER
----- Message from Jaclyn Mckinnon MD sent at 6/16/2018  6:28 PM EDT -----  Please review this report to determine why testing could not be completed    Thank you

## 2018-06-20 ENCOUNTER — TRANSCRIBE ORDERS (OUTPATIENT)
Dept: LAB | Facility: HOSPITAL | Age: 18
End: 2018-06-20

## 2018-06-20 ENCOUNTER — LAB (OUTPATIENT)
Dept: LAB | Facility: HOSPITAL | Age: 18
End: 2018-06-20
Attending: OBSTETRICS & GYNECOLOGY
Payer: COMMERCIAL

## 2018-06-20 DIAGNOSIS — Z36.9 RESEARCH REQUESTED ANTENATAL ULTRASOUND SCAN: Primary | ICD-10-CM

## 2018-06-20 DIAGNOSIS — Z33.1 PREGNANT STATE, INCIDENTAL: ICD-10-CM

## 2018-06-20 DIAGNOSIS — Z36.9 RESEARCH REQUESTED ANTENATAL ULTRASOUND SCAN: ICD-10-CM

## 2018-06-20 PROCEDURE — 36415 COLL VENOUS BLD VENIPUNCTURE: CPT

## 2018-06-21 ENCOUNTER — TELEPHONE (OUTPATIENT)
Dept: PERINATAL CARE | Facility: CLINIC | Age: 18
End: 2018-06-21

## 2018-06-21 ENCOUNTER — ROUTINE PRENATAL (OUTPATIENT)
Dept: OBGYN CLINIC | Facility: CLINIC | Age: 18
End: 2018-06-21

## 2018-06-21 VITALS — WEIGHT: 144 LBS | BODY MASS INDEX: 26.34 KG/M2 | DIASTOLIC BLOOD PRESSURE: 68 MMHG | SYSTOLIC BLOOD PRESSURE: 102 MMHG

## 2018-06-21 DIAGNOSIS — Z34.02 ENCOUNTER FOR SUPERVISION OF NORMAL FIRST PREGNANCY IN SECOND TRIMESTER: Primary | ICD-10-CM

## 2018-06-21 LAB — SCAN RESULT: NORMAL

## 2018-06-21 PROCEDURE — PNV: Performed by: NURSE PRACTITIONER

## 2018-06-21 NOTE — PROGRESS NOTES
OFFICE VISIT: Denies any N/V, HA, Cramping, VB, LOF, Edema, Domestic Violence, Smoking  No fetal movement yet  Tolerating PNV  Feeling better with diclegis, only taking every once in a while now, trying to wean  Has level II U/S scheduled for Monday  RTO 4 weeks or sooner as needed

## 2018-06-21 NOTE — PROGRESS NOTES
Please have one of the [facility] physicians review this report which is not accessible to me today    Thank you

## 2018-06-25 ENCOUNTER — ROUTINE PRENATAL (OUTPATIENT)
Dept: PERINATAL CARE | Facility: CLINIC | Age: 18
End: 2018-06-25
Payer: COMMERCIAL

## 2018-06-25 VITALS
HEIGHT: 62 IN | HEART RATE: 86 BPM | DIASTOLIC BLOOD PRESSURE: 73 MMHG | WEIGHT: 144.8 LBS | SYSTOLIC BLOOD PRESSURE: 116 MMHG | BODY MASS INDEX: 26.65 KG/M2

## 2018-06-25 DIAGNOSIS — Z36.86 ENCOUNTER FOR ANTENATAL SCREENING FOR CERVICAL LENGTH: ICD-10-CM

## 2018-06-25 DIAGNOSIS — Z3A.20 20 WEEKS GESTATION OF PREGNANCY: ICD-10-CM

## 2018-06-25 DIAGNOSIS — Z36.3 ENCOUNTER FOR ANTENATAL SCREENING FOR MALFORMATIONS: Primary | ICD-10-CM

## 2018-06-25 PROCEDURE — 76817 TRANSVAGINAL US OBSTETRIC: CPT | Performed by: OBSTETRICS & GYNECOLOGY

## 2018-06-25 PROCEDURE — 99212 OFFICE O/P EST SF 10 MIN: CPT | Performed by: OBSTETRICS & GYNECOLOGY

## 2018-06-25 PROCEDURE — 76805 OB US >/= 14 WKS SNGL FETUS: CPT | Performed by: OBSTETRICS & GYNECOLOGY

## 2018-06-25 NOTE — LETTER
June 25, 2018     Anuj Hong    Patient: Carlito Box   YOB: 2000   Date of Visit: 6/25/2018       Dear Dr Mae Morris: Thank you for referring Paula Romano to me for evaluation  Below are my notes for this consultation  If you have questions, please do not hesitate to call me  I look forward to following your patient along with you  Sincerely,        Gato Avila MD        CC: No Recipients  Gato Avila MD  6/25/2018  8:37 AM  Sign at close encounter  Please refer to the [facility] ultrasound report in Ob Procedures for additional information regarding the visit to the [facility]  today

## 2018-06-25 NOTE — PROGRESS NOTES
A transvaginal ultrasound was performed   Sonographer note on use of High Level Disinfection Process (Trophon) for transvaginal probe#  3 used, serial # [id number]  Angie Mireles RDMS

## 2018-06-25 NOTE — PROGRESS NOTES
Please refer to the [facility] ultrasound report in Ob Procedures for additional information regarding the visit to the [facility] hospital[facility]  today

## 2018-06-27 ENCOUNTER — TELEPHONE (OUTPATIENT)
Dept: PERINATAL CARE | Facility: CLINIC | Age: 18
End: 2018-06-27

## 2018-07-02 ENCOUNTER — TELEPHONE (OUTPATIENT)
Dept: OBGYN CLINIC | Facility: CLINIC | Age: 18
End: 2018-07-02

## 2018-07-02 DIAGNOSIS — O21.9 NAUSEA AND VOMITING IN PREGNANCY: ICD-10-CM

## 2018-07-02 RX ORDER — DOXYLAMINE SUCCINATE AND PYRIDOXINE HYDROCHLORIDE, DELAYED RELEASE TABLETS 10 MG/10 MG 10; 10 MG/1; MG/1
TABLET, DELAYED RELEASE ORAL
Qty: 90 TABLET | Refills: 0 | Status: SHIPPED | OUTPATIENT
Start: 2018-07-02 | End: 2018-08-16 | Stop reason: SDUPTHER

## 2018-07-19 ENCOUNTER — ROUTINE PRENATAL (OUTPATIENT)
Dept: OBGYN CLINIC | Facility: CLINIC | Age: 18
End: 2018-07-19

## 2018-07-19 VITALS — BODY MASS INDEX: 27.62 KG/M2 | DIASTOLIC BLOOD PRESSURE: 78 MMHG | SYSTOLIC BLOOD PRESSURE: 118 MMHG | WEIGHT: 151 LBS

## 2018-07-19 DIAGNOSIS — Z34.02 ENCOUNTER FOR SUPERVISION OF NORMAL FIRST PREGNANCY IN SECOND TRIMESTER: Primary | ICD-10-CM

## 2018-07-19 PROCEDURE — PNV: Performed by: PHYSICIAN ASSISTANT

## 2018-07-19 NOTE — PROGRESS NOTES
Visit: Good FM, Swelling in lower legs worse at end of day, improves with rest  Reviewed hydration  No N/V, HA, cramping, VB, LOF, domestic violence, or smoking  Tolerating PNV  Script for 28 wk labwork given including 1hr GTT and CBC with diff  Continue routine prenatal care  Return to office in 4 weeks for ob check

## 2018-07-31 ENCOUNTER — TELEPHONE (OUTPATIENT)
Dept: OBGYN CLINIC | Facility: CLINIC | Age: 18
End: 2018-07-31

## 2018-07-31 ENCOUNTER — HOSPITAL ENCOUNTER (OUTPATIENT)
Facility: HOSPITAL | Age: 18
Discharge: HOME | End: 2018-07-31
Attending: OBSTETRICS & GYNECOLOGY | Admitting: OBSTETRICS & GYNECOLOGY
Payer: COMMERCIAL

## 2018-07-31 VITALS
TEMPERATURE: 98.5 F | SYSTOLIC BLOOD PRESSURE: 98 MMHG | DIASTOLIC BLOOD PRESSURE: 59 MMHG | RESPIRATION RATE: 16 BRPM | HEART RATE: 91 BPM

## 2018-07-31 PROCEDURE — G0463 HOSPITAL OUTPT CLINIC VISIT: HCPCS

## 2018-07-31 PROCEDURE — 76815 OB US LIMITED FETUS(S): CPT | Performed by: OBSTETRICS & GYNECOLOGY

## 2018-07-31 PROCEDURE — 99214 OFFICE O/P EST MOD 30 MIN: CPT

## 2018-07-31 NOTE — PROGRESS NOTES
L&D Triage Note - OB/GYN  Kaylah Hernandez 21 y o  female MRN: [id number]  Unit/Bed#:  Triage  Encounter: [id number]      Assessment:  21 y o   at 25w1d with reactive/reassuring fetal testing noted  Reassurance provided to patient, as well as education on expected movement patterns and initiation of fetal kick counts at 28wks  Plan:  1  Discharge to home with precautions    Discussed with Dr Natalie Solorznao      ______________________________________________________________________      Chief Compliant: no FM    TIME: 1140  Subjective:  21 y o   at 25w1d presents with no fetal movement all day today  Notes she felt movement last night, but since then has felt nothing  Now that she is in triage and on monitor, she feels more movement   She denies ctxns, lof, vb        Objective:  Vitals:    18 1100   BP: 98/59   Pulse: 91   Resp: 16   Temp: 98 5 °F (36 9 °C)       FHT:  140 / Minimal 1 - 5 bpm / +accels (10x10), reactive  Adwolf: irritability    TAUS  TINY 16 7cm  +FM noted      Laura Gray MD 2018 11:39 AM

## 2018-08-13 ENCOUNTER — APPOINTMENT (OUTPATIENT)
Dept: LAB | Facility: HOSPITAL | Age: 18
End: 2018-08-13
Attending: PHYSICIAN ASSISTANT
Payer: COMMERCIAL

## 2018-08-13 DIAGNOSIS — Z34.02 ENCOUNTER FOR SUPERVISION OF NORMAL FIRST PREGNANCY IN SECOND TRIMESTER: ICD-10-CM

## 2018-08-13 LAB
BASOPHILS # BLD AUTO: 0.02 THOUSANDS/ΜL (ref 0–0.1)
BASOPHILS NFR BLD AUTO: 0 % (ref 0–1)
EOSINOPHIL # BLD AUTO: 0.04 THOUSAND/ΜL (ref 0–0.61)
EOSINOPHIL NFR BLD AUTO: 1 % (ref 0–6)
ERYTHROCYTE [DISTWIDTH] IN BLOOD BY AUTOMATED COUNT: 13.4 % (ref 11.6–15.1)
GLUCOSE 1H P 50 G GLC PO SERPL-MCNC: 89 MG/DL
HCT VFR BLD AUTO: 33.5 % (ref 34.8–46.1)
HGB BLD-MCNC: 10.7 G/DL (ref 11.5–15.4)
IMM GRANULOCYTES # BLD AUTO: 0.02 THOUSAND/UL (ref 0–0.2)
IMM GRANULOCYTES NFR BLD AUTO: 0 % (ref 0–2)
LYMPHOCYTES # BLD AUTO: 1.92 THOUSANDS/ΜL (ref 0.6–4.47)
LYMPHOCYTES NFR BLD AUTO: 23 % (ref 14–44)
MCH RBC QN AUTO: 30.4 PG (ref 26.8–34.3)
MCHC RBC AUTO-ENTMCNC: 31.9 G/DL (ref 31.4–37.4)
MCV RBC AUTO: 95 FL (ref 82–98)
MONOCYTES # BLD AUTO: 0.5 THOUSAND/ΜL (ref 0.17–1.22)
MONOCYTES NFR BLD AUTO: 6 % (ref 4–12)
NEUTROPHILS # BLD AUTO: 5.87 THOUSANDS/ΜL (ref 1.85–7.62)
NEUTS SEG NFR BLD AUTO: 70 % (ref 43–75)
NRBC BLD AUTO-RTO: 0 /100 WBCS
PLATELET # BLD AUTO: 253 THOUSANDS/UL (ref 149–390)
PMV BLD AUTO: 10.3 FL (ref 8.9–12.7)
RBC # BLD AUTO: 3.52 MILLION/UL (ref 3.81–5.12)
WBC # BLD AUTO: 8.37 THOUSAND/UL (ref 4.31–10.16)

## 2018-08-13 PROCEDURE — 82950 GLUCOSE TEST: CPT

## 2018-08-13 PROCEDURE — 36415 COLL VENOUS BLD VENIPUNCTURE: CPT

## 2018-08-13 PROCEDURE — 85025 COMPLETE CBC W/AUTO DIFF WBC: CPT

## 2018-08-16 ENCOUNTER — ROUTINE PRENATAL (OUTPATIENT)
Dept: OBGYN CLINIC | Facility: CLINIC | Age: 18
End: 2018-08-16

## 2018-08-16 VITALS
HEIGHT: 62 IN | SYSTOLIC BLOOD PRESSURE: 114 MMHG | DIASTOLIC BLOOD PRESSURE: 66 MMHG | WEIGHT: 156 LBS | BODY MASS INDEX: 28.71 KG/M2

## 2018-08-16 DIAGNOSIS — Z3A.27 27 WEEKS GESTATION OF PREGNANCY: ICD-10-CM

## 2018-08-16 DIAGNOSIS — O21.9 NAUSEA AND VOMITING IN PREGNANCY: Primary | ICD-10-CM

## 2018-08-16 PROBLEM — Z3A.17 17 WEEKS GESTATION OF PREGNANCY: Status: RESOLVED | Noted: 2018-06-07 | Resolved: 2018-08-16

## 2018-08-16 PROCEDURE — PNV: Performed by: PHYSICIAN ASSISTANT

## 2018-08-16 RX ORDER — DOXYLAMINE SUCCINATE AND PYRIDOXINE HYDROCHLORIDE, DELAYED RELEASE TABLETS 10 MG/10 MG 10; 10 MG/1; MG/1
TABLET, DELAYED RELEASE ORAL
Qty: 90 TABLET | Refills: 0 | Status: SHIPPED | OUTPATIENT
Start: 2018-08-16 | End: 2018-10-03 | Stop reason: SDUPTHER

## 2018-08-16 NOTE — PROGRESS NOTES
VISIT: Denies n/v/HA/cramping/vb/lof/edema/dv/smoking; n/v under control with 2 tabs of Diclegis at night - needs refill  PNVs + DHA - tolerating daily  Good FM - r/anil 10 kicks/2 hours; checked out at L&D about 2 weeks ago for decreased fetal movement - discharged with check out ok; still some days feels less movement - reviewed first baby and anterior placenta sometimes movement perceived as less - still important to get fetal kick counts and best to call if ever concerned of decreased movement for evaluation  Reviewed 28 week labs - 1 hr GTT WNL; Advise OTC iron supplement with vitamin C to be taken every other day or every third day - reviewed risk of constipation   Encourage iron rich foods  Has 32 week growth scan scheduled    Baby and Me 2nd trimester completed and booklet reviewed and given- patient is planning on breastfeeding  RTO in 3 weeks for routine ob check or sooner if needed

## 2018-09-07 ENCOUNTER — ROUTINE PRENATAL (OUTPATIENT)
Dept: OBGYN CLINIC | Facility: CLINIC | Age: 18
End: 2018-09-07

## 2018-09-07 VITALS
BODY MASS INDEX: 28.52 KG/M2 | WEIGHT: 155 LBS | SYSTOLIC BLOOD PRESSURE: 118 MMHG | DIASTOLIC BLOOD PRESSURE: 66 MMHG | HEIGHT: 62 IN

## 2018-09-07 DIAGNOSIS — Z34.03 ENCOUNTER FOR SUPERVISION OF NORMAL FIRST PREGNANCY IN THIRD TRIMESTER: Primary | ICD-10-CM

## 2018-09-07 PROCEDURE — PNV: Performed by: OBSTETRICS & GYNECOLOGY

## 2018-09-07 NOTE — PROGRESS NOTES
Patient reports good fm, no n/v, headache, cramping, bleeding, loss of fluid, edema, dom violence, or smoking  shannon pnv  Given 30 week pack advised tdap has pnc follow up, return in 2 weeks or sooner as needed

## 2018-09-17 ENCOUNTER — ULTRASOUND (OUTPATIENT)
Dept: PERINATAL CARE | Facility: CLINIC | Age: 18
End: 2018-09-17
Payer: COMMERCIAL

## 2018-09-17 VITALS
DIASTOLIC BLOOD PRESSURE: 75 MMHG | HEART RATE: 99 BPM | HEIGHT: 62 IN | BODY MASS INDEX: 29.11 KG/M2 | WEIGHT: 158.2 LBS | SYSTOLIC BLOOD PRESSURE: 116 MMHG

## 2018-09-17 DIAGNOSIS — Z3A.32 32 WEEKS GESTATION OF PREGNANCY: Primary | ICD-10-CM

## 2018-09-17 PROCEDURE — 76816 OB US FOLLOW-UP PER FETUS: CPT | Performed by: OBSTETRICS & GYNECOLOGY

## 2018-09-17 NOTE — PROGRESS NOTES
Ms Korin Kovacs is a 22 yo  at 28 0/7   weeks gestation who presents for a fetal ultrasound examination for growth  She is asymptomatic  See Ob procedures  Ultrasound:  1  Live fetus in vertex   presentation  2    Fetal size = dates  AC at the 14% today  3   No fetal anomalies observed  4  Normal TINY 15 2   5  Placenta is anterior and not a placenta previa  I reviewed the results of this ultrasound and answered  all the questions  Recommendations:  1  Follow-up ultrasound in 4 weeks to monitor fetal growth       Meño Mendoza MD

## 2018-09-21 ENCOUNTER — TELEPHONE (OUTPATIENT)
Dept: OBGYN CLINIC | Facility: CLINIC | Age: 18
End: 2018-09-21

## 2018-09-25 ENCOUNTER — ROUTINE PRENATAL (OUTPATIENT)
Dept: OBGYN CLINIC | Facility: CLINIC | Age: 18
End: 2018-09-25

## 2018-09-25 VITALS
HEIGHT: 62 IN | SYSTOLIC BLOOD PRESSURE: 108 MMHG | WEIGHT: 159 LBS | DIASTOLIC BLOOD PRESSURE: 66 MMHG | BODY MASS INDEX: 29.26 KG/M2

## 2018-09-25 DIAGNOSIS — Z34.03 ENCOUNTER FOR SUPERVISION OF NORMAL FIRST PREGNANCY IN THIRD TRIMESTER: Primary | ICD-10-CM

## 2018-09-25 PROCEDURE — PNV: Performed by: NURSE PRACTITIONER

## 2018-09-25 NOTE — PROGRESS NOTES
OFFICE VISIT: Pt complaining of heart burn  Pt states no relief with TUMS  Reviewed small frequent meals, bland meals, try not to lay down after eating  Reviewed Prilosec, Zantac, Pepcid ok  Reviewed how to take and how they work  Occasional headache, resolves with Tylenol  Denies any N/V, Cramping, VB, LOF, Edema, Domestic Violence, Smoking  + FM  Tolerating PNV  Had growth scan, plan for repeat growth scan at 36 weeks  RTO 2 weeks for GBS or sooner as needed

## 2018-09-30 ENCOUNTER — HOSPITAL ENCOUNTER (OUTPATIENT)
Facility: HOSPITAL | Age: 18
Discharge: HOME | End: 2018-09-30
Attending: OBSTETRICS & GYNECOLOGY | Admitting: OBSTETRICS & GYNECOLOGY
Payer: COMMERCIAL

## 2018-09-30 VITALS
DIASTOLIC BLOOD PRESSURE: 70 MMHG | BODY MASS INDEX: 29.26 KG/M2 | HEART RATE: 116 BPM | SYSTOLIC BLOOD PRESSURE: 126 MMHG | HEIGHT: 62 IN | TEMPERATURE: 98.5 F | WEIGHT: 159 LBS | RESPIRATION RATE: 18 BRPM

## 2018-09-30 DIAGNOSIS — B37.3 CANDIDIASIS OF GENITALIA IN FEMALE: Primary | ICD-10-CM

## 2018-09-30 PROCEDURE — 76817 TRANSVAGINAL US OBSTETRIC: CPT | Performed by: OBSTETRICS & GYNECOLOGY

## 2018-09-30 PROCEDURE — 99214 OFFICE O/P EST MOD 30 MIN: CPT

## 2018-09-30 PROCEDURE — 76815 OB US LIMITED FETUS(S): CPT | Performed by: OBSTETRICS & GYNECOLOGY

## 2018-09-30 PROCEDURE — G0463 HOSPITAL OUTPT CLINIC VISIT: HCPCS

## 2018-09-30 NOTE — DISCHARGE INSTRUCTIONS
Take medication for three days  The cream is for external use only  Vulvovaginal Candidiasis   WHAT YOU NEED TO KNOW:   What is vulvovaginal candidiasis? Vulvovaginal candidiasis, or yeast infection, is a common vaginal infection  Vulvovaginal candidiasis is caused by a fungus, or yeast-like germ  Fungi are normally found in your vagina  When there are too many fungi, it can cause an infection  What increases my risk for vulvovaginal candidiasis? · Pregnancy    · Medical conditions that suppress your immune system, such as diabetes or HIV and AIDS    · Medicines, such as antibiotics, birth control pills, or steroid medicine    · Contraceptive devices, such as diaphragms, sponges, and intrauterine devices  What are the signs and symptoms of vulvovaginal candidiasis? · Thick, white, cheese-like discharge from your vagina    · Itching, swelling, or redness in your vagina    · Burning when you urinate  How is vulvovaginal candidiasis diagnosed? Your healthcare provider will ask about your medical history and examine you  A sample of your vaginal discharge may show what germ is causing your infection  How is vulvovaginal candidiasis treated? Medicines help treat the fungal infection and decrease inflammation  The medicine may be a pill, topical cream, or vaginal suppository  With treatment, the infection is usually gone within a week: How can I manage my symptoms? · Wear cotton underwear  · Keep the vaginal area clean and dry  · Wipe from front to back after you urinate or have a bowel movement  · Do not have sex until your symptoms are gone  · Do not douche  · Do not use strong perfumes or soaps  · Do not use feminine hygiene sprays, powders, or bubble bath  How can I prevent another infection? · Take showers instead of baths  · Eat yogurt  · Limit the amount of alcohol you drink  · Limit your time in hot tubs  · Control your blood sugar if you are diabetic    When should I seek immediate care? · You have fever and chills  · You are bleeding from your vagina and it is not your monthly period  · You develop abdominal or pelvic pain  When should I contact my healthcare provider? · Your signs and symptoms get worse, even after treatment  · You have questions or concerns about your condition or care  CARE AGREEMENT:   You have the right to help plan your care  Learn about your health condition and how it may be treated  Discuss treatment options with your caregivers to decide what care you want to receive  You always have the right to refuse treatment  The above information is an  only  It is not intended as medical advice for individual conditions or treatments  Talk to your doctor, nurse or pharmacist before following any medical regimen to see if it is safe and effective for you  © 2017 [employer] [employer] Information is for End User's use only and may not be sold, redistributed or otherwise used for commercial purposes  All illustrations and images included in CareNotes® are the copyrighted property of A D A M [employer] Inc  or Salvador Rodriguez

## 2018-10-01 ENCOUNTER — TELEPHONE (OUTPATIENT)
Dept: OBGYN CLINIC | Facility: CLINIC | Age: 18
End: 2018-10-01

## 2018-10-03 ENCOUNTER — TELEPHONE (OUTPATIENT)
Dept: OBGYN CLINIC | Facility: CLINIC | Age: 18
End: 2018-10-03

## 2018-10-03 DIAGNOSIS — O21.9 NAUSEA AND VOMITING IN PREGNANCY: ICD-10-CM

## 2018-10-03 RX ORDER — DOXYLAMINE SUCCINATE AND PYRIDOXINE HYDROCHLORIDE, DELAYED RELEASE TABLETS 10 MG/10 MG 10; 10 MG/1; MG/1
TABLET, DELAYED RELEASE ORAL
Qty: 90 TABLET | Refills: 0 | Status: SHIPPED | OUTPATIENT
Start: 2018-10-03 | End: 2018-11-08 | Stop reason: HOSPADM

## 2018-10-08 ENCOUNTER — ROUTINE PRENATAL (OUTPATIENT)
Dept: OBGYN CLINIC | Facility: CLINIC | Age: 18
End: 2018-10-08

## 2018-10-08 VITALS
WEIGHT: 159 LBS | BODY MASS INDEX: 29.26 KG/M2 | SYSTOLIC BLOOD PRESSURE: 116 MMHG | DIASTOLIC BLOOD PRESSURE: 68 MMHG | HEIGHT: 62 IN

## 2018-10-08 DIAGNOSIS — Z34.03 ENCOUNTER FOR SUPERVISION OF NORMAL FIRST PREGNANCY IN THIRD TRIMESTER: Primary | ICD-10-CM

## 2018-10-08 PROBLEM — Z34.02 ENCOUNTER FOR SUPERVISION OF NORMAL FIRST PREGNANCY IN SECOND TRIMESTER: Status: RESOLVED | Noted: 2018-04-27 | Resolved: 2018-10-08

## 2018-10-08 LAB
EXTERNAL GROUP B STREP ANTIGEN: NEGATIVE

## 2018-10-08 PROCEDURE — PNV: Performed by: PHYSICIAN ASSISTANT

## 2018-10-08 NOTE — PROGRESS NOTES
VISIT: Denies n/v/HA/cramping/vb/lof/edema/dv/smoking; taking diclegis 2 tabs q evening  PNVs + DHA - tolerating daily  Good FM - r/anil 10 kicks/2 hrs  Tdap - reviewed and encouraged; Flu shot - reviewed and encouraged   Has growth scan at 36 weeks    GBS done  RTO in 1 weeks for LT/routine ob check or sooner if needed

## 2018-10-11 LAB
EXTERNAL GROUP B STREP ANTIGEN: NEGATIVE
GP B STREP GENITAL QL CULT: NEGATIVE

## 2018-10-15 ENCOUNTER — ULTRASOUND (OUTPATIENT)
Dept: PERINATAL CARE | Facility: CLINIC | Age: 18
End: 2018-10-15
Payer: COMMERCIAL

## 2018-10-15 VITALS
HEART RATE: 109 BPM | DIASTOLIC BLOOD PRESSURE: 75 MMHG | HEIGHT: 62 IN | BODY MASS INDEX: 29.3 KG/M2 | WEIGHT: 159.2 LBS | SYSTOLIC BLOOD PRESSURE: 110 MMHG

## 2018-10-15 DIAGNOSIS — Z3A.36 36 WEEKS GESTATION OF PREGNANCY: ICD-10-CM

## 2018-10-15 DIAGNOSIS — O36.5930 POOR FETAL GROWTH AFFECTING MANAGEMENT OF MOTHER IN THIRD TRIMESTER, SINGLE OR UNSPECIFIED FETUS: Primary | ICD-10-CM

## 2018-10-15 PROCEDURE — 76820 UMBILICAL ARTERY ECHO: CPT | Performed by: OBSTETRICS & GYNECOLOGY

## 2018-10-15 PROCEDURE — 99212 OFFICE O/P EST SF 10 MIN: CPT | Performed by: OBSTETRICS & GYNECOLOGY

## 2018-10-15 PROCEDURE — 76816 OB US FOLLOW-UP PER FETUS: CPT | Performed by: OBSTETRICS & GYNECOLOGY

## 2018-10-15 PROCEDURE — 76818 FETAL BIOPHYS PROFILE W/NST: CPT | Performed by: OBSTETRICS & GYNECOLOGY

## 2018-10-15 PROCEDURE — 76821 MIDDLE CEREBRAL ARTERY ECHO: CPT | Performed by: OBSTETRICS & GYNECOLOGY

## 2018-10-15 NOTE — PATIENT INSTRUCTIONS

## 2018-10-16 ENCOUNTER — ROUTINE PRENATAL (OUTPATIENT)
Dept: OBGYN CLINIC | Facility: CLINIC | Age: 18
End: 2018-10-16

## 2018-10-16 VITALS — DIASTOLIC BLOOD PRESSURE: 72 MMHG | SYSTOLIC BLOOD PRESSURE: 112 MMHG | WEIGHT: 160 LBS | BODY MASS INDEX: 29.26 KG/M2

## 2018-10-16 DIAGNOSIS — Z34.03 ENCOUNTER FOR SUPERVISION OF NORMAL FIRST PREGNANCY IN THIRD TRIMESTER: Primary | ICD-10-CM

## 2018-10-16 PROCEDURE — PNV: Performed by: NURSE PRACTITIONER

## 2018-10-16 NOTE — PROGRESS NOTES
OFFICE VISIT: Denies any N/V, HA, Cramping, VB, LOF, Edema, Domestic Violence, Smoking  + FM  Tolerating PNV  Labor talk done  Would like to go without epidural but open to one  Has bags packed and car seat installed  Had TDap and Flu vaccines  GBS negative  Having twice weekly APFS due to IUGR, would like to do one NST here  RTO 1 week for NST and OB check or sooner as needed

## 2018-10-18 ENCOUNTER — OFFICE VISIT (OUTPATIENT)
Dept: PERINATAL CARE | Facility: CLINIC | Age: 18
End: 2018-10-18
Payer: COMMERCIAL

## 2018-10-18 VITALS
BODY MASS INDEX: 29.3 KG/M2 | HEIGHT: 62 IN | HEART RATE: 92 BPM | WEIGHT: 159.2 LBS | SYSTOLIC BLOOD PRESSURE: 109 MMHG | DIASTOLIC BLOOD PRESSURE: 72 MMHG

## 2018-10-18 DIAGNOSIS — Z3A.36 36 WEEKS GESTATION OF PREGNANCY: ICD-10-CM

## 2018-10-18 DIAGNOSIS — O36.5930 POOR FETAL GROWTH AFFECTING MANAGEMENT OF MOTHER IN THIRD TRIMESTER, SINGLE OR UNSPECIFIED FETUS: Primary | ICD-10-CM

## 2018-10-18 PROCEDURE — 59025 FETAL NON-STRESS TEST: CPT | Performed by: OBSTETRICS & GYNECOLOGY

## 2018-10-18 NOTE — PROGRESS NOTES
NST procedure and expected outcome explained to patient  Daily fetal kick count reviewed and emphasized  Patient verbalized understanding of all and was receptive      Ervin Brown RN

## 2018-10-22 ENCOUNTER — APPOINTMENT (OUTPATIENT)
Dept: PERINATAL CARE | Facility: CLINIC | Age: 18
End: 2018-10-22
Payer: COMMERCIAL

## 2018-10-22 ENCOUNTER — ROUTINE PRENATAL (OUTPATIENT)
Dept: PERINATAL CARE | Facility: CLINIC | Age: 18
End: 2018-10-22
Payer: COMMERCIAL

## 2018-10-22 VITALS
WEIGHT: 159 LBS | SYSTOLIC BLOOD PRESSURE: 111 MMHG | DIASTOLIC BLOOD PRESSURE: 69 MMHG | HEART RATE: 84 BPM | HEIGHT: 62 IN | BODY MASS INDEX: 29.26 KG/M2

## 2018-10-22 DIAGNOSIS — Z3A.37 37 WEEKS GESTATION OF PREGNANCY: ICD-10-CM

## 2018-10-22 DIAGNOSIS — O36.5930 POOR FETAL GROWTH AFFECTING MANAGEMENT OF MOTHER IN THIRD TRIMESTER, SINGLE OR UNSPECIFIED FETUS: Primary | ICD-10-CM

## 2018-10-22 PROCEDURE — 76815 OB US LIMITED FETUS(S): CPT | Performed by: OBSTETRICS & GYNECOLOGY

## 2018-10-22 PROCEDURE — 76820 UMBILICAL ARTERY ECHO: CPT | Performed by: OBSTETRICS & GYNECOLOGY

## 2018-10-22 PROCEDURE — 59025 FETAL NON-STRESS TEST: CPT | Performed by: OBSTETRICS & GYNECOLOGY

## 2018-10-22 NOTE — PROGRESS NOTES
nursing education was provided by Salvatore Fairbanks RN today on kick counting and on warning signs that should prompt her to call her physician    She was instructed to call her obstetrician with any questions or concerns, to monitor fetal movement, and to notify her obstetrician if she notices decreased fetal movement -Salvatore Fairbanks RN

## 2018-10-25 ENCOUNTER — ROUTINE PRENATAL (OUTPATIENT)
Dept: OBGYN CLINIC | Facility: CLINIC | Age: 18
End: 2018-10-25
Payer: COMMERCIAL

## 2018-10-25 VITALS — DIASTOLIC BLOOD PRESSURE: 60 MMHG | WEIGHT: 159 LBS | BODY MASS INDEX: 29.08 KG/M2 | SYSTOLIC BLOOD PRESSURE: 112 MMHG

## 2018-10-25 DIAGNOSIS — Z34.03 ENCOUNTER FOR SUPERVISION OF NORMAL FIRST PREGNANCY IN THIRD TRIMESTER: Primary | ICD-10-CM

## 2018-10-25 DIAGNOSIS — O36.5930 POOR FETAL GROWTH AFFECTING MANAGEMENT OF MOTHER IN THIRD TRIMESTER, SINGLE OR UNSPECIFIED FETUS: ICD-10-CM

## 2018-10-25 PROCEDURE — PNV: Performed by: PHYSICIAN ASSISTANT

## 2018-10-25 PROCEDURE — 59025 FETAL NON-STRESS TEST: CPT | Performed by: PHYSICIAN ASSISTANT

## 2018-10-25 NOTE — PROGRESS NOTES
Visit: Good FM, No N/V, HA, cramping, VB, LOF, edema, domestic violence, or smoking  Tolerating PNV   NST: Reactive, reassuring  TOCO: negative  Declines cervical check today, reports closed last week  Continue routine prenatal care  Return to office on Monday for ob check/NST  Has appt at [facility] next Thursday for growth and NST  Planning to induce at 39 weeks, task sent to clinical team to put patient on list to call

## 2018-10-29 ENCOUNTER — TELEPHONE (OUTPATIENT)
Dept: OBGYN CLINIC | Facility: CLINIC | Age: 18
End: 2018-10-29

## 2018-10-29 ENCOUNTER — ULTRASOUND (OUTPATIENT)
Dept: OBGYN CLINIC | Facility: CLINIC | Age: 18
End: 2018-10-29
Payer: COMMERCIAL

## 2018-10-29 VITALS
DIASTOLIC BLOOD PRESSURE: 68 MMHG | HEIGHT: 62 IN | WEIGHT: 161 LBS | BODY MASS INDEX: 29.63 KG/M2 | HEART RATE: 64 BPM | SYSTOLIC BLOOD PRESSURE: 114 MMHG

## 2018-10-29 DIAGNOSIS — Z34.03 ENCOUNTER FOR SUPERVISION OF NORMAL FIRST PREGNANCY IN THIRD TRIMESTER: Primary | ICD-10-CM

## 2018-10-29 PROCEDURE — 59025 FETAL NON-STRESS TEST: CPT | Performed by: PHYSICIAN ASSISTANT

## 2018-10-29 PROCEDURE — PNV: Performed by: PHYSICIAN ASSISTANT

## 2018-10-29 NOTE — PROGRESS NOTES
VISIT: Denies n/v/HA/cramping/vb/lof/edema/dv/smoking; continuing twice weekly APFS for IUGR - PNC recommends IOL at 39 weeks  PNVs + DHA - tolerating daily  Good FM - r/anil 10 kicks/2 hrs     NST - baseline 140; reactive with good accels; no decels; toco silent - no contractions  Cervix - fingertip - closed and thick; 0 cm and -1 station    Reviewed signs and symptoms of labor and when to call; Reviewed signs and symptoms of pregnancy induced hypertension or preeclampsia and when to call  Patient has [facility] follow-up this Thursday;  Reviewed IOL with patient in great detail - will bring in the night before for cervical ripening;  Task sent to [facility] to schedule IOL for one week from today - [facility] will call patient with date and time  Patient to call or RTO with any concerns

## 2018-11-01 ENCOUNTER — ULTRASOUND (OUTPATIENT)
Dept: PERINATAL CARE | Facility: CLINIC | Age: 18
End: 2018-11-01
Payer: COMMERCIAL

## 2018-11-01 VITALS
HEIGHT: 62 IN | HEART RATE: 102 BPM | WEIGHT: 162.3 LBS | SYSTOLIC BLOOD PRESSURE: 107 MMHG | DIASTOLIC BLOOD PRESSURE: 73 MMHG | BODY MASS INDEX: 29.87 KG/M2

## 2018-11-01 DIAGNOSIS — Z3A.38 38 WEEKS GESTATION OF PREGNANCY: ICD-10-CM

## 2018-11-01 DIAGNOSIS — O36.5930 POOR FETAL GROWTH AFFECTING MANAGEMENT OF MOTHER IN THIRD TRIMESTER, SINGLE OR UNSPECIFIED FETUS: Primary | ICD-10-CM

## 2018-11-01 DIAGNOSIS — Z36.89 ENCOUNTER FOR ULTRASOUND TO CHECK FETAL GROWTH: ICD-10-CM

## 2018-11-01 PROCEDURE — 76820 UMBILICAL ARTERY ECHO: CPT | Performed by: OBSTETRICS & GYNECOLOGY

## 2018-11-01 PROCEDURE — 59025 FETAL NON-STRESS TEST: CPT | Performed by: OBSTETRICS & GYNECOLOGY

## 2018-11-01 PROCEDURE — 76816 OB US FOLLOW-UP PER FETUS: CPT | Performed by: OBSTETRICS & GYNECOLOGY

## 2018-11-01 PROCEDURE — 99212 OFFICE O/P EST SF 10 MIN: CPT | Performed by: OBSTETRICS & GYNECOLOGY

## 2018-11-01 NOTE — PROGRESS NOTES
nursing education was provided by Kole Gerber RN today on kick counting, labor precautions, and on warning signs that should prompt her to call her physician  The NST procedure and expected outcome were explained to patient  She was instructed to call her obstetrician with any questions or concerns, to monitor fetal movement, and to notify her obstetrician if she notices decreased fetal movement  She verbalized understanding and all questions were answered   Kole Gerber RN

## 2018-11-01 NOTE — PROGRESS NOTES
[address]: Ms Lance Sun was seen today at 56L6C for umbilical artery Doppler study, TINY, NST, and growth ultrasound  See ultrasound report under "OB Procedures" tab  Please don't hesitate to contact our office with any concerns or questions    Juan Pablo Urbina MD

## 2018-11-02 ENCOUNTER — OB ABSTRACT (OUTPATIENT)
Dept: OBGYN CLINIC | Facility: CLINIC | Age: 18
End: 2018-11-02

## 2018-11-04 ENCOUNTER — HOSPITAL ENCOUNTER (INPATIENT)
Facility: HOSPITAL | Age: 18
LOS: 4 days | Discharge: HOME | End: 2018-11-08
Attending: OBSTETRICS & GYNECOLOGY | Admitting: OBSTETRICS & GYNECOLOGY
Payer: COMMERCIAL

## 2018-11-04 ENCOUNTER — HOSPITAL ENCOUNTER (OUTPATIENT)
Dept: LABOR AND DELIVERY | Facility: HOSPITAL | Age: 18
Discharge: HOME | End: 2018-11-04
Payer: COMMERCIAL

## 2018-11-04 DIAGNOSIS — Z3A.38 38 WEEKS GESTATION OF PREGNANCY: Primary | ICD-10-CM

## 2018-11-04 DIAGNOSIS — O21.9 NAUSEA AND VOMITING IN PREGNANCY: ICD-10-CM

## 2018-11-04 LAB
ABO GROUP BLD: NORMAL
BLD GP AB SCN SERPL QL: NEGATIVE
ERYTHROCYTE [DISTWIDTH] IN BLOOD BY AUTOMATED COUNT: 13.7 % (ref 11.6–15.1)
HCT VFR BLD AUTO: 33 % (ref 34.8–46.1)
HGB BLD-MCNC: 11 G/DL (ref 11.5–15.4)
MCH RBC QN AUTO: 29.8 PG (ref 26.8–34.3)
MCHC RBC AUTO-ENTMCNC: 33.3 G/DL (ref 31.4–37.4)
MCV RBC AUTO: 89 FL (ref 82–98)
PLATELET # BLD AUTO: 225 THOUSANDS/UL (ref 149–390)
PMV BLD AUTO: 10.6 FL (ref 8.9–12.7)
RBC # BLD AUTO: 3.69 MILLION/UL (ref 3.81–5.12)
RH BLD: POSITIVE
SPECIMEN EXPIRATION DATE: NORMAL
WBC # BLD AUTO: 11.87 THOUSAND/UL (ref 4.31–10.16)

## 2018-11-04 PROCEDURE — 86850 RBC ANTIBODY SCREEN: CPT | Performed by: OBSTETRICS & GYNECOLOGY

## 2018-11-04 PROCEDURE — 86592 SYPHILIS TEST NON-TREP QUAL: CPT | Performed by: OBSTETRICS & GYNECOLOGY

## 2018-11-04 PROCEDURE — 86900 BLOOD TYPING SEROLOGIC ABO: CPT | Performed by: OBSTETRICS & GYNECOLOGY

## 2018-11-04 PROCEDURE — 85027 COMPLETE CBC AUTOMATED: CPT | Performed by: OBSTETRICS & GYNECOLOGY

## 2018-11-04 PROCEDURE — 86901 BLOOD TYPING SEROLOGIC RH(D): CPT | Performed by: OBSTETRICS & GYNECOLOGY

## 2018-11-04 PROCEDURE — 3E0P7VZ INTRODUCTION OF HORMONE INTO FEMALE REPRODUCTIVE, VIA NATURAL OR ARTIFICIAL OPENING: ICD-10-PCS | Performed by: OBSTETRICS & GYNECOLOGY

## 2018-11-04 RX ORDER — SODIUM CHLORIDE, SODIUM LACTATE, POTASSIUM CHLORIDE, CALCIUM CHLORIDE 600; 310; 30; 20 MG/100ML; MG/100ML; MG/100ML; MG/100ML
125 INJECTION, SOLUTION INTRAVENOUS CONTINUOUS
Status: DISCONTINUED | OUTPATIENT
Start: 2018-11-04 | End: 2018-11-06

## 2018-11-04 RX ORDER — OXYTOCIN/RINGER'S LACTATE 30/500 ML
2 PLASTIC BAG, INJECTION (ML) INTRAVENOUS CONTINUOUS
Status: DISCONTINUED | OUTPATIENT
Start: 2018-11-04 | End: 2018-11-05

## 2018-11-04 RX ORDER — ONDANSETRON 2 MG/ML
4 INJECTION INTRAMUSCULAR; INTRAVENOUS EVERY 6 HOURS PRN
Status: DISCONTINUED | OUTPATIENT
Start: 2018-11-04 | End: 2018-11-06

## 2018-11-04 RX ADMIN — SODIUM CHLORIDE, SODIUM LACTATE, POTASSIUM CHLORIDE, AND CALCIUM CHLORIDE 125 ML/HR: .6; .31; .03; .02 INJECTION, SOLUTION INTRAVENOUS at 23:46

## 2018-11-04 RX ADMIN — Medication 2 MILLI-UNITS/MIN: at 23:46

## 2018-11-05 LAB — RPR SER QL: NORMAL

## 2018-11-05 RX ORDER — OXYTOCIN/RINGER'S LACTATE 30/500 ML
1-30 PLASTIC BAG, INJECTION (ML) INTRAVENOUS
Status: DISCONTINUED | OUTPATIENT
Start: 2018-11-05 | End: 2018-11-06

## 2018-11-05 RX ORDER — PYRIDOXINE HCL (VITAMIN B6) 50 MG
25 TABLET ORAL EVERY 8 HOURS PRN
Status: DISCONTINUED | OUTPATIENT
Start: 2018-11-05 | End: 2018-11-05

## 2018-11-05 RX ORDER — DOXYLAMINE SUCCINATE AND PYRIDOXINE HYDROCHLORIDE, DELAYED RELEASE TABLETS 10 MG/10 MG 10; 10 MG/1; MG/1
1 TABLET, DELAYED RELEASE ORAL EVERY 8 HOURS PRN
Status: DISCONTINUED | OUTPATIENT
Start: 2018-11-05 | End: 2018-11-08 | Stop reason: HOSPADM

## 2018-11-05 RX ADMIN — SODIUM CHLORIDE, SODIUM LACTATE, POTASSIUM CHLORIDE, AND CALCIUM CHLORIDE 125 ML/HR: .6; .31; .03; .02 INJECTION, SOLUTION INTRAVENOUS at 16:51

## 2018-11-05 RX ADMIN — Medication 3 MILLI-UNITS/MIN: at 08:31

## 2018-11-05 RX ADMIN — SODIUM CHLORIDE, SODIUM LACTATE, POTASSIUM CHLORIDE, AND CALCIUM CHLORIDE 125 ML/HR: .6; .31; .03; .02 INJECTION, SOLUTION INTRAVENOUS at 23:01

## 2018-11-05 RX ADMIN — ONDANSETRON 4 MG: 2 INJECTION INTRAMUSCULAR; INTRAVENOUS at 08:28

## 2018-11-05 RX ADMIN — DOXYLAMINE SUCCINATE AND PYRIDOXINE HYDROCHLORIDE, DELAYED RELEASE TABLETS 10 MG/10 MG 1 EACH: 10; 10 TABLET, DELAYED RELEASE ORAL at 02:42

## 2018-11-05 RX ADMIN — SODIUM CHLORIDE, SODIUM LACTATE, POTASSIUM CHLORIDE, AND CALCIUM CHLORIDE 125 ML/HR: .6; .31; .03; .02 INJECTION, SOLUTION INTRAVENOUS at 08:28

## 2018-11-05 RX ADMIN — DOXYLAMINE SUCCINATE AND PYRIDOXINE HYDROCHLORIDE, DELAYED RELEASE TABLETS 10 MG/10 MG 1 EACH: 10; 10 TABLET, DELAYED RELEASE ORAL at 20:45

## 2018-11-05 NOTE — OB LABOR/OXYTOCIN SAFETY PROGRESS
Oxytocin Safety Progress Check Note - Carlos Loveless 19 y o  female MRN: [id number]    Unit/Bed#: -01 Encounter: [id number]    Obstetric History       T0      L0     SAB0   TAB1   Ectopic0   Multiple0   Live Births0      Gestational Age: 39w0d  Dose (ann-units/min) Oxytocin: 12 ann-units/min  Contraction Frequency (minutes): 1 5-3  Contraction Quality: Mild  Tachysystole: No   Dilation: 4-5        Effacement (%): 70  Station: -2  Fetal Heart Rate: 145 BPM  FHR Category: Category I     Oxytocin Safety Progress Check: Safety check completed    Notes/comments:   Patient is comfortable feeling mild contractions  Small cervical change  Will continue Pitocin titration per protocol and consider AROM when effacement 80-90%    D/w Dr Maritza Wolff MD 2018 2:24 PM

## 2018-11-05 NOTE — OB LABOR/OXYTOCIN SAFETY PROGRESS
Oxytocin Safety Progress Check Note - Amari Westfall 19 y o  female MRN: [id number]    Unit/Bed#: -01 Encounter: [id number]    Obstetric History       T0      L0     SAB0   TAB1   Ectopic0   Multiple0   Live Births0      Gestational Age: 39w0d  Dose (ann-units/min) Oxytocin: 14 ann-units/min  Contraction Frequency (minutes): 2  Contraction Quality: Mild  Tachysystole: No   Dilation: 6        Effacement (%): 90  Station: -2  Baseline Rate: 145 bpm  Fetal Heart Rate: 147 BPM  FHR Category: Category I     Oxytocin Safety Progress Check: Safety check completed    Notes/comments:   Patient currently comfortable, not request epidural at that time  At the as described above  Plan to AROM on next cervical check      FHT with intermittent variable decelerations, cat 2  Korey ever 2 minutes          Neal Crowell MD 2018 4:48 PM

## 2018-11-05 NOTE — NURSING NOTE
Pt very uncomfortable after nieves balloon placement  20ml removed from nieves balloon  Pt feels relief

## 2018-11-05 NOTE — OB LABOR/OXYTOCIN SAFETY PROGRESS
Oxytocin Safety Progress Check Note - Halle Foster 19 y o  female MRN: [id number]    Unit/Bed#: LD Triage  Encounter: [id number]    Obstetric History       T0      L0     SAB0   TAB1   Ectopic0   Multiple0   Live Births0      Gestational Age: 39w0d  Dose (ann-units/min) Oxytocin: 2 ann-units/min  Contraction Frequency (minutes): 2  Contraction Quality: Mild  Tachysystole: No   Dilation: 4        Effacement (%): 60  Station: -2  Baseline Rate: 130 bpm  Fetal Heart Rate: 157 BPM  FHR Category: Category I          Notes/comments:     De Souza balloon fell out when patient stood to go to the bathroom  SVE now /-2  Will start pitocin titration after light breakfast   Dr Adri Joshua and Dr Db Eldridge aware      Nicolle Bazan MD 2018 7:42 AM

## 2018-11-05 NOTE — H&P
History & Physical - OB/GYN   Delta [place] 19 y o  female MRN: [id number]  Unit/Bed#: LD Triage  Encounter: [id number]    19 y o   at 38w6d by LMP  She is a patient of [facility]  Chief complaint:  Induction of labor    HPI:  Contractions:  no  Fetal movement:  yes  Vaginal bleeding:   no  Leaking of fluid:  no      Pregnancy Complications:  1) IUGR -  EFW [id number], normal dopplers  2) Rubella non-immune     PMH:  Past Medical History:   Diagnosis Date    IBS (irritable bowel syndrome)     Pregnancy     Varicella     as child       PSH:  Past Surgical History:   Procedure Laterality Date    INDUCED       WISDOM TOOTH EXTRACTION         OB Hx:  Obstetric History       T0      L0     SAB0   TAB1   Ectopic0   Multiple0   Live Births0       # Outcome Date GA Lbr Bruce/2nd Weight Sex Delivery Anes PTL Lv   2 Current            1 TAB 17     TAB             Meds:  No current facility-administered medications on file prior to encounter  Current Outpatient Prescriptions on File Prior to Encounter   Medication Sig Dispense Refill    Doxylamine-Pyridoxine (DICLEGIS) 10-10 MG TBEC Take 2 tabs at bedtime, symptoms persist after 2 days add 1 tab in AM, symptoms persist add 1 tab in afternoon  No more than 4 tabs daily  90 tablet 0    Prenatal MV-Min-Fe Fum-FA-DHA (PRENATAL 1 PO) Take by mouth         Allergies:  No Known Allergies    Review of Systems   Constitutional: Negative for chills and fever  Eyes: Negative for visual disturbance  Respiratory: Negative for cough and shortness of breath  Cardiovascular: Negative for chest pain  Gastrointestinal: Negative for diarrhea, nausea and vomiting  Genitourinary: Negative for vaginal bleeding, vaginal discharge and vaginal pain  Neurological: Negative for headaches  All other systems reviewed and are negative        Physical Exam:  LMP  (LMP Unknown)     Physical Exam   Constitutional: She is oriented to person, place, and time  She appears well-developed and well-nourished  No distress  HENT:   Head: Normocephalic and atraumatic  Cardiovascular: Normal rate, regular rhythm and normal heart sounds  Pulmonary/Chest: Effort normal  No respiratory distress  She has no wheezes  Abdominal:   Gravid, soft, non-tender   Musculoskeletal: She exhibits no edema  Neurological: She is alert and oriented to person, place, and time  Skin: Skin is warm and dry  Psychiatric: She has a normal mood and affect  Labs:  Blood type: O+  Antibody: negative  Group B strep: negative  HIV: negative  Hepatitis B: negative  RPR: non-reactive  Rubella: Not immune  Varicella Immune  1 hour Glucose: 89    Estimated Fetal Weight: 5 lbs 10 oz (11/1 [facility] ultrasound)  Presentation: vertex    SVE: ft / 50% / -2  FHT:  145 / Moderate 6 - 25 bpm / accels, 1 variable that self resolved  [place]: q2 minutes    Membranes: intact      Assessment:   19 y o  Ernestine Dimase at 38w6d admitted for induction of labor for IUGR, normal dopplers  Plan:   1  Admit to L&D  2  CBC, RPR, type and screen  3  Will attempt to place nieves balloon since pt is miguel angel too frequently  Epidural upon request    Discussed with Dr Kwame Sotomayor MD  OB/GYN PGY-1  11/4/2018  7:42 PM

## 2018-11-05 NOTE — OB LABOR/OXYTOCIN SAFETY PROGRESS
Oxytocin Safety Progress Check Note - Ja Lopez 19 y o  female MRN: [id number]    Unit/Bed#: -01 Encounter: [id number]    Obstetric History       T0      L0     SAB0   TAB1   Ectopic0   Multiple0   Live Births0      Gestational Age: 39w0d  Dose (ann-units/min) Oxytocin: 7 ann-units/min  Contraction Frequency (minutes): 3-4  Contraction Quality: Mild  Tachysystole: No   Dilation:  (Defer)        Effacement (%): 60  Station: -2  Baseline Rate: 145 bpm  FHR Category: Category I     Oxytocin Safety Progress Check: Safety check completed    Notes/comments:   Patient comfortable feeling mild contractions  Will defer cervical check at this time  Fetal heart tracing category 2 secondary to periodic subtle late deceleration, however, overall fetal heart tracing with moderate variability and 15 x 15 accelerations noted  Will continue Pitocin titration  Reassess in 2 hr or sooner if uncomfortable    To be discussed with Dr Kimberly Chakraborty MD 2018 11:00 AM

## 2018-11-05 NOTE — OB LABOR/OXYTOCIN SAFETY PROGRESS
Oxytocin Safety Progress Check Note - Kang Cavazos 19 y o  female MRN: [id number]    Unit/Bed#: LD Triage  Encounter: [id number]    Obstetric History       T0      L0     SAB0   TAB1   Ectopic0   Multiple0   Live Births0      Gestational Age: 39w0d  Dose (ann-units/min) Oxytocin: 2 ann-units/min  Contraction Frequency (minutes): 2-3  Contraction Quality: Mild      Dilation: Fingertip        Effacement (%): 50  Station: -2  Baseline Rate: 140 bpm  Fetal Heart Rate: 157 BPM  FHR Category: Category I          Notes/comments:     Pitocin started 2hrs ago  Patient feels comfortable  Has been able to walk around with the portable monitor  SVE deferred  Cat I tracing  Irregular contractions, q2-5 minutes  Will reassess in 2hrs or as clinically indicated      Viviana Claudio MD 2018 1:54 AM

## 2018-11-05 NOTE — OB LABOR/OXYTOCIN SAFETY PROGRESS
Oxytocin Safety Progress Check Note - Dargeovanny Cancel 19 y o  female MRN: [id number]    Unit/Bed#: LD Triage  Encounter: [id number]    Obstetric History       T0      L0     SAB0   TAB1   Ectopic0   Multiple0   Live Births0      Gestational Age: 38w7d               Dilation: Fingertip        Effacement (%): 50  Station: -2     Fetal Heart Rate: 157 BPM             Notes/comments:     When patient arrived in triage, she was miguel angel every 2 minutes on the monitor  Attempt was made to place a nieves balloon, but was unsuccessful  She was given a 500c LR bolus, but her contractions were considered to still be too frequent for cytotec  Decision was made to start her on straight dose pitocin, at 2 units, for ripening  Cat I tracing  Will reassess 2hrs after starting pitocin, or as clinically indicated  Plan discussed w/ Dr Sarah Washburn MD 2018 11:20 PM

## 2018-11-05 NOTE — OB LABOR/OXYTOCIN SAFETY PROGRESS
Oxytocin Safety Progress Check Note - Tarangelia Jurist 19 y o  female MRN: [id number]    Unit/Bed#: LD Triage  Encounter: [id number]    Obstetric History       T0      L0     SAB0   TAB1   Ectopic0   Multiple0   Live Births0      Gestational Age: 39w0d  Dose (ann-units/min) Oxytocin: 2 ann-units/min  Contraction Frequency (minutes): 2-3  Contraction Quality: Mild  Tachysystole: No   Dilation: 1        Effacement (%): 50  Station: -2  Baseline Rate: 125 bpm  Fetal Heart Rate: 157 BPM  FHR Category: Category I          Notes/comments:     SVE /-2  Cat I tracing, miguel angel q2-3 mins  De Souza balloon place at 5am, patient tolerated procedure well  Can get stadol if requesting pain medication  Continue pitocin at 2  Dr Barbie Schultz aware      Mary Spencer MD 2018 5:04 AM

## 2018-11-06 ENCOUNTER — ANESTHESIA (INPATIENT)
Dept: LABOR AND DELIVERY | Facility: HOSPITAL | Age: 18
End: 2018-11-06
Payer: COMMERCIAL

## 2018-11-06 ENCOUNTER — ANESTHESIA EVENT (INPATIENT)
Dept: LABOR AND DELIVERY | Facility: HOSPITAL | Age: 18
End: 2018-11-06
Payer: COMMERCIAL

## 2018-11-06 LAB
BASE EXCESS BLDCOV CALC-SCNC: -3.8 MMOL/L (ref 1–9)
HCO3 BLDCOV-SCNC: 20.2 MMOL/L (ref 12.2–28.6)
OXYHGB MFR BLDCOV: 67.7 %
PCO2 BLDCOV: 34.1 MM HG (ref 27–43)
PH BLDCOV: 7.39 [PH] (ref 7.19–7.49)
PO2 BLDCOV: 27.9 MM HG (ref 15–45)
SAO2 % BLDCOV: 14.3 ML/DL

## 2018-11-06 PROCEDURE — 10907ZC DRAINAGE OF AMNIOTIC FLUID, THERAPEUTIC FROM PRODUCTS OF CONCEPTION, VIA NATURAL OR ARTIFICIAL OPENING: ICD-10-PCS | Performed by: OBSTETRICS & GYNECOLOGY

## 2018-11-06 PROCEDURE — 59400 OBSTETRICAL CARE: CPT | Performed by: OBSTETRICS & GYNECOLOGY

## 2018-11-06 PROCEDURE — 82805 BLOOD GASES W/O2 SATURATION: CPT | Performed by: OBSTETRICS & GYNECOLOGY

## 2018-11-06 RX ORDER — DOCUSATE SODIUM 100 MG/1
100 CAPSULE, LIQUID FILLED ORAL 2 TIMES DAILY PRN
Status: DISCONTINUED | OUTPATIENT
Start: 2018-11-06 | End: 2018-11-08 | Stop reason: HOSPADM

## 2018-11-06 RX ORDER — BUPIVACAINE HYDROCHLORIDE 2.5 MG/ML
INJECTION, SOLUTION INFILTRATION; PERINEURAL AS NEEDED
Status: DISCONTINUED | OUTPATIENT
Start: 2018-11-06 | End: 2018-11-06 | Stop reason: SURG

## 2018-11-06 RX ORDER — ACETAMINOPHEN 325 MG/1
650 TABLET ORAL EVERY 6 HOURS PRN
Status: DISCONTINUED | OUTPATIENT
Start: 2018-11-06 | End: 2018-11-08 | Stop reason: HOSPADM

## 2018-11-06 RX ORDER — DIAPER,BRIEF,INFANT-TODD,DISP
1 EACH MISCELLANEOUS 4 TIMES DAILY PRN
Status: DISCONTINUED | OUTPATIENT
Start: 2018-11-06 | End: 2018-11-08 | Stop reason: HOSPADM

## 2018-11-06 RX ORDER — OXYCODONE HYDROCHLORIDE AND ACETAMINOPHEN 5; 325 MG/1; MG/1
1 TABLET ORAL EVERY 4 HOURS PRN
Status: DISCONTINUED | OUTPATIENT
Start: 2018-11-06 | End: 2018-11-08 | Stop reason: HOSPADM

## 2018-11-06 RX ORDER — CALCIUM CARBONATE 200(500)MG
500 TABLET,CHEWABLE ORAL DAILY PRN
Status: DISCONTINUED | OUTPATIENT
Start: 2018-11-06 | End: 2018-11-06

## 2018-11-06 RX ORDER — FENTANYL CITRATE 50 UG/ML
INJECTION, SOLUTION INTRAMUSCULAR; INTRAVENOUS
Status: COMPLETED
Start: 2018-11-06 | End: 2018-11-06

## 2018-11-06 RX ORDER — CHLOROPROCAINE HYDROCHLORIDE 30 MG/ML
INJECTION, SOLUTION EPIDURAL; INFILTRATION; INTRACAUDAL; PERINEURAL AS NEEDED
Status: DISCONTINUED | OUTPATIENT
Start: 2018-11-06 | End: 2018-11-06 | Stop reason: SURG

## 2018-11-06 RX ORDER — FENTANYL CITRATE 50 UG/ML
INJECTION, SOLUTION INTRAMUSCULAR; INTRAVENOUS AS NEEDED
Status: DISCONTINUED | OUTPATIENT
Start: 2018-11-06 | End: 2018-11-06

## 2018-11-06 RX ORDER — CLONIDINE 100 UG/ML
INJECTION, SOLUTION EPIDURAL AS NEEDED
Status: DISCONTINUED | OUTPATIENT
Start: 2018-11-06 | End: 2018-11-06 | Stop reason: SURG

## 2018-11-06 RX ORDER — FENTANYL CITRATE 50 UG/ML
INJECTION, SOLUTION INTRAMUSCULAR; INTRAVENOUS AS NEEDED
Status: DISCONTINUED | OUTPATIENT
Start: 2018-11-06 | End: 2018-11-06 | Stop reason: SURG

## 2018-11-06 RX ORDER — OXYCODONE HYDROCHLORIDE AND ACETAMINOPHEN 5; 325 MG/1; MG/1
2 TABLET ORAL EVERY 4 HOURS PRN
Status: DISCONTINUED | OUTPATIENT
Start: 2018-11-06 | End: 2018-11-08 | Stop reason: HOSPADM

## 2018-11-06 RX ORDER — ROPIVACAINE HYDROCHLORIDE 2 MG/ML
INJECTION, SOLUTION EPIDURAL; INFILTRATION; PERINEURAL AS NEEDED
Status: DISCONTINUED | OUTPATIENT
Start: 2018-11-06 | End: 2018-11-06 | Stop reason: SURG

## 2018-11-06 RX ORDER — LIDOCAINE HYDROCHLORIDE AND EPINEPHRINE 15; 5 MG/ML; UG/ML
INJECTION, SOLUTION EPIDURAL AS NEEDED
Status: DISCONTINUED | OUTPATIENT
Start: 2018-11-06 | End: 2018-11-06 | Stop reason: SURG

## 2018-11-06 RX ORDER — IBUPROFEN 400 MG/1
400 TABLET ORAL EVERY 4 HOURS PRN
Status: DISCONTINUED | OUTPATIENT
Start: 2018-11-06 | End: 2018-11-08 | Stop reason: HOSPADM

## 2018-11-06 RX ADMIN — SODIUM CHLORIDE, SODIUM LACTATE, POTASSIUM CHLORIDE, AND CALCIUM CHLORIDE 999 ML/HR: .6; .31; .03; .02 INJECTION, SOLUTION INTRAVENOUS at 11:27

## 2018-11-06 RX ADMIN — LIDOCAINE HYDROCHLORIDE AND EPINEPHRINE 3 ML: 15; 5 INJECTION, SOLUTION EPIDURAL at 11:16

## 2018-11-06 RX ADMIN — ROPIVACAINE HYDROCHLORIDE 4 ML: 2 INJECTION, SOLUTION EPIDURAL; INFILTRATION at 11:30

## 2018-11-06 RX ADMIN — FENTANYL CITRATE 10 MCG: 50 INJECTION, SOLUTION INTRAMUSCULAR; INTRAVENOUS at 12:45

## 2018-11-06 RX ADMIN — ROPIVACAINE HYDROCHLORIDE 4 ML: 2 INJECTION, SOLUTION EPIDURAL; INFILTRATION at 11:38

## 2018-11-06 RX ADMIN — ROPIVACAINE HYDROCHLORIDE: 2 INJECTION, SOLUTION EPIDURAL; INFILTRATION at 11:27

## 2018-11-06 RX ADMIN — BENZOCAINE AND LEVOMENTHOL: 200; 5 SPRAY TOPICAL at 17:30

## 2018-11-06 RX ADMIN — ONDANSETRON 4 MG: 2 INJECTION INTRAMUSCULAR; INTRAVENOUS at 07:54

## 2018-11-06 RX ADMIN — DOCUSATE SODIUM 100 MG: 100 CAPSULE, LIQUID FILLED ORAL at 17:30

## 2018-11-06 RX ADMIN — CLONIDINE 100 MCG: 100 INJECTION, SOLUTION EPIDURAL at 11:52

## 2018-11-06 RX ADMIN — Medication 2 MILLI-UNITS/MIN: at 01:08

## 2018-11-06 RX ADMIN — BUPIVACAINE HYDROCHLORIDE 1 ML: 2.5 INJECTION, SOLUTION INFILTRATION; PERINEURAL at 12:45

## 2018-11-06 RX ADMIN — LIDOCAINE HYDROCHLORIDE AND EPINEPHRINE 2 ML: 15; 5 INJECTION, SOLUTION EPIDURAL at 11:20

## 2018-11-06 RX ADMIN — IBUPROFEN 400 MG: 400 TABLET ORAL at 17:30

## 2018-11-06 RX ADMIN — ROPIVACAINE HYDROCHLORIDE 4 ML: 2 INJECTION, SOLUTION EPIDURAL; INFILTRATION at 11:25

## 2018-11-06 RX ADMIN — ANTACID TABLETS 500 MG: 500 TABLET, CHEWABLE ORAL at 01:02

## 2018-11-06 RX ADMIN — CHLOROPROCAINE HYDROCHLORIDE 10 ML: 30 INJECTION, SOLUTION EPIDURAL; INFILTRATION at 15:32

## 2018-11-06 RX ADMIN — ROPIVACAINE HYDROCHLORIDE 4 ML: 2 INJECTION, SOLUTION EPIDURAL; INFILTRATION at 11:52

## 2018-11-06 RX ADMIN — SODIUM CHLORIDE, SODIUM LACTATE, POTASSIUM CHLORIDE, AND CALCIUM CHLORIDE 125 ML/HR: .6; .31; .03; .02 INJECTION, SOLUTION INTRAVENOUS at 06:33

## 2018-11-06 RX ADMIN — SODIUM CHLORIDE, SODIUM LACTATE, POTASSIUM CHLORIDE, AND CALCIUM CHLORIDE 500 ML: .6; .31; .03; .02 INJECTION, SOLUTION INTRAVENOUS at 14:45

## 2018-11-06 NOTE — OB LABOR/OXYTOCIN SAFETY PROGRESS
Oxytocin Safety Progress Check Note - Elvis Polanco 19 y o  female MRN: [id number]    Unit/Bed#: -01 Encounter: [id number]    Obstetric History       T0      L0     SAB0   TAB1   Ectopic0   Multiple0   Live Births0      Gestational Age: 39w1d  Dose (ann-units/min) Oxytocin: 15 ann-units/min  Contraction Frequency (minutes): 1-2  Contraction Quality: Moderate  Tachysystole: No   Dilation: 5        Effacement (%): 100  Station: 0  Baseline Rate: 145 bpm  FHR Category: Category I     Oxytocin Safety Progress Check: Safety check completed    Notes/comments:   Patient remains on comfortable with epidural 1 having contractions- patient has just received a bolus from anesthesia  Full bladder noted on cervical examination- will place De Souza catheter as she is having leg numbness  Some cervical change noted she is now 5/100/0  Will continue Pitocin titration and monitor closely    Discussed with Dr Andreea Hudson MD 2018 12:07 PM

## 2018-11-06 NOTE — OB LABOR/OXYTOCIN SAFETY PROGRESS
Oxytocin Safety Progress Check Note - John Cancel 19 y o  female MRN: [id number]    Unit/Bed#: -01 Encounter: [id number]    Obstetric History       T0      L0     SAB0   TAB1   Ectopic0   Multiple0   Live Births0      Gestational Age: 39w1d  Dose (ann-units/min) Oxytocin: 3 ann-units/min (start titrating at this time per dr Dwyer Mask)  Contraction Frequency (minutes): 0  Contraction Quality: Mild  Tachysystole: No   Dilation: 5        Effacement (%): 80  Station: -1  Baseline Rate: 140 bpm  Fetal Heart Rate: 138 BPM  FHR Category: Category I     Oxytocin Safety Progress Check: Safety check completed    Notes/comments:     SVE /-1  Will continue SD pitocin and will consider titration at next check  Cat I tracing  Patient comfortable  Dr Catrachita Washburn MD 2018 12:54 AM

## 2018-11-06 NOTE — ANESTHESIA PROCEDURE NOTES
Epidural Block    Patient location during procedure: OB  Start time: 11/6/2018 11:03 AM  Reason for block: procedure for pain and at surgeon's request  Staffing  Anesthesiologist: Gerson Sanchez  Performed: anesthesiologist   Preanesthetic Checklist  Completed: patient identified, surgical consent, pre-op evaluation, timeout performed, IV checked, risks and benefits discussed and monitors and equipment checked  Epidural  Patient position: sitting  Prep: Betasept and site prepped and draped  Patient monitoring: heart rate, continuous pulse ox and frequent blood pressure checks  Approach: midline  Location: lumbar (1-5) (L4/5)  Injection technique: JANIE air  Needle  Needle type: Tuohy   Epidural needle gauge: 17G  Catheter type: side hole  Catheter size: 19G  Catheter at skin depth: 11 cm  Test dose: negative and lidocaine 1 5% with epinephrine 1-to-200,000negative aspiration for CSF, negative aspiration for heme and no paresthesia on injection  patient tolerated the procedure well with no immediate complications  Additional Notes  Epidural X1 with 17G Touhy w/o problems  Tested with 3mls  Lido1 5% with Epi 1:200,000 via needle  19G Epidural cath  Inserted w/o incident  After no asp  CSF/Heme, 2mls  Lido 1 5% with Epi 1:200,000, injected via cath  w/o incident  Taped  Dosed with 5mls  0 2% Ropivicaine via catheter  Started on infusion of 0 2% Ropivicaine at 10 mls/hr  Bilat  Leg Numbness, L>R  L Hip wedge Placed with Back elevated

## 2018-11-06 NOTE — L&D DELIVERY NOTE
DELIVERY NOTE  Delta [place] 19 y o  female MRN: [id number]  Unit/Bed#: -01 Encounter: [id number]    Obstetrician:   Olivia Thorpe    Pre-Delivery Diagnosis: Term pregnancy  Induced labor  Single fetus  Pregnancy complicated by: IUGR    Post-Delivery Diagnosis: Same as above - Delivered  Viable female fetus  1st degree laceration    Procedure: Spontaneous vaginal delivery             Complications:  None    Description of Delivery:  Yamilka Rossi is a 57-year-old white  2 para 65 female who was admitted the evening of 2018 for induction of labor secondary to IUGR at 39 weeks  She was initially given De Souza catheter with Pitocin and following morning the De Souza had fallen out and she was 4 cm 70% effaced and -2 station  She was begun on Pitocin titration and made minimal cervical change  The Pitocin titration was discontinued and she was allowed to eat and ambulate for a few hours into the evening of 2018  Later that evening she was started on low-dose Pitocin throughout the night  It was discontinued and Pitocin titration was resumed the morning of 2018  At that point she began to contract and regular manner and made some cervical change, she underwent amniotomy at 10:05 a m  For a scant amount of pinkish colored fluid  She continued on the Pitocin titration, she requested and was given epidural analgesia  She began  having occasional variable deceleration size, and late decelerations but otherwise reassuring fetal status  Amnio infusion was begun with alleviation of the variable deceleration is a after 200 cc were infused in  There was no additional fluid return  Late decelerations improved with change in position and IV hydration  She continued to progress well  She achieved complete cervical dilatation at 3:40 p m  Neri Vides She began pushing at 3:45 p m     At 3:59 p m  she delivered a viable female  via spontaneous assisted vaginal delivery    There was a posterior left nuchal arm, so delivery involved delivery of the posterior shoulder and arm prior to delivery of the anterior shoulder without complication  Cord was clamped and cut after pulsations ceased  Cord blood venous was obtained, cord blood arterial was not able to be obtained  Cord blood and the tube was obtained without difficulty  The placenta was expelled spontaneously intact at 4:04 p m  With trailing membranes  Uterus contracted well to massage and IV Pitocin  There was a very small 1st degree right periurethral tear which was hemostatic and no suturing was required  Baby's Apgars were 8 at 1 min 9 at 5 min  The baby was doing well bonding with mother at the end of delivery

## 2018-11-06 NOTE — OB LABOR/OXYTOCIN SAFETY PROGRESS
Oxytocin Safety Progress Check Note - Carlos Loveless 19 y o  female MRN: [id number]    Unit/Bed#: -01 Encounter: [id number]    Obstetric History       T0      L0     SAB0   TAB1   Ectopic0   Multiple0   Live Births0      Gestational Age: 39w0d  Dose (ann-units/min) Oxytocin: 2 ann-units/min (restarted at this time per dr Jono forrester  Low dose pitocin protocol )  Contraction Frequency (minutes): occasional (pt laying on side difficult to trace)  Contraction Quality: Mild  Tachysystole: No   Dilation: 4-5        Effacement (%): 80  Station: -2  Baseline Rate: 150 bpm  Fetal Heart Rate: 142 BPM  FHR Category: Category I     Oxytocin Safety Progress Check: Safety check completed    Notes/comments:     SVE deferred  Hard to trace contractions given patient on her side, moving  Cat I tracing      Cristian Morton MD 2018 11:52 PM

## 2018-11-06 NOTE — ANESTHESIA PREPROCEDURE EVALUATION
Pat @0800    Review of Systems/Medical History          Cardiovascular   Pulmonary  Not a smoker ,        GI/Hepatic            Endo/Other     GYN       Hematology   Musculoskeletal  No back pain ,        Neurology    Headaches,    Psychology   Anxiety,            Epidural discussed with patient  Side effects, including post dural puncture headache discussed  All questions answered  Consent given by patient  Physical Exam    Airway    Mallampati score: I  TM Distance: >3 FB  Neck ROM: full     Dental   No notable dental hx     Cardiovascular      Pulmonary      Other Findings        Lab Results   Component Value Date    GLUC 95 02/11/2018    ALT 34 02/11/2018    AST 15 02/11/2018    BUN 20 02/11/2018    CALCIUM 9 7 02/11/2018     02/11/2018    CO2 25 02/11/2018    CREATININE 0 78 02/11/2018    HCT 33 0 (L) 11/04/2018    HGB 11 0 (L) 11/04/2018     11/04/2018    K 3 5 02/11/2018    WBC 11 87 (H) 11/04/2018     Anesthesia Plan  ASA Score- 1     Anesthesia Type- epidural with ASA Monitors  Additional Monitors:   Airway Plan:         Plan Factors-Patient not instructed to abstain from smoking on day of procedure  Patient did not smoke on day of surgery  Induction-     Postoperative Plan-     Informed Consent- Anesthetic plan and risks discussed with mother and patient

## 2018-11-06 NOTE — ANESTHESIA PROCEDURE NOTES
CSE Block    Patient location during procedure: OB  Start time: 11/6/2018 12:39 PM  Reason for block: procedure for pain and at surgeon's request  Staffing  Anesthesiologist: Kerry Jaramillo  Performed: anesthesiologist   Preanesthetic Checklist  Completed: patient identified, site marked, surgical consent, pre-op evaluation, timeout performed, IV checked, risks and benefits discussed and monitors and equipment checked  CSE  Patient position: sitting  Prep: Betadine and site prepped and draped  Patient monitoring: heart rate, continuous pulse ox and frequent blood pressure checks  Approach: midline  Spinal Needle  Needle type: pencil-tip   Needle gauge: 27 G  Needle length: 10 cm  Epidural Needle  Injection technique: JANIE air  Needle type: Tuohy   Epidural needle gauge: 17G  Needle insertion depth: 5 5 cm  Location: lumbar (1-5) (L4/5)  Catheter  Catheter type: side hole  Catheter size: 19G  Catheter at skin depth: 10 cm  Assessment  Injection Assessment:  negative aspiration for heme, no pain on injection, no paresthesia on injection and positive aspiration for clear CSF  Additional Notes  Epidural X 1 attempt  L/P with 27G, Clear CSF aspirated  1 ml  0 25% Bupivicaine with 10 mcgs  Fentanyl injected intrathecal  27G needle out  After no aspiration of CSF or Heme, 3mls  N/S injected via 17G Needle  19 G Epidural catheter inserted w/o incident  Taped  Pt  Supine  Back elevated  R Hip wedge  Good pain relief  Bilat  Leg numbness, R=L   Started infusion of 0 2% Ropivicaine at 10 mls /hr

## 2018-11-06 NOTE — PLAN OF CARE
DISCHARGE PLANNING     Discharge to home or other facility with appropriate resources Progressing        INFECTION - ADULT     Absence or prevention of progression during hospitalization Progressing     Absence of fever/infection during neutropenic period Progressing        Knowledge Deficit     Patient/family/caregiver demonstrates understanding of disease process, treatment plan, medications, and discharge instructions Progressing        Labor & Delivery     Manages discomfort Progressing     Patient vital signs are stable Progressing        POSTPARTUM     Experiences normal postpartum course Progressing     Appropriate maternal -  bonding Progressing     Establishment of infant feeding pattern Progressing     Incision(s), wounds(s) or drain site(s) healing without S/S of infection Progressing        SAFETY ADULT     Patient will remain free of falls Progressing     Maintain or return to baseline ADL function Progressing     Maintain or return mobility status to optimal level Progressing

## 2018-11-06 NOTE — OB LABOR/OXYTOCIN SAFETY PROGRESS
Oxytocin Safety Progress Check Note - Railisymone Loss 19 y o  female MRN: [id number]    Unit/Bed#: -01 Encounter: [id number]    Obstetric History       T0      L0     SAB0   TAB1   Ectopic0   Multiple0   Live Births0      Gestational Age: 39w1d  Dose (ann-units/min) Oxytocin: 14 ann-units/min  Contraction Frequency (minutes): 1 5-3  Contraction Quality: Moderate  Tachysystole: No   Dilation: 5        Effacement (%): 80  Station: -1  Baseline Rate: 145 bpm  Fetal Heart Rate: 145 BPM  FHR Category: Category II     Oxytocin Safety Progress Check: Safety check completed    Notes/comments:   Patient feeling contraction pain, at this time declines anything for pain  AROM for a minimal amount of bloody fluid at 1005  No cervical change nor on examination  Fetal heart tracing category 2 secondary to periodic variable decelerations, overall moderate variability noted with 15x15 accelerations seen    Will continue Pitocin titration per protocol and monitor closely     To be discussed with Dr Charlotte Saucedo MD 2018 10:19 AM

## 2018-11-06 NOTE — DISCHARGE SUMMARY
Discharge Summary - OB/GYN  Mat Wilcox 19 y o  female MRN: [id number]  Unit/Bed#: -01 Encounter: [id number]    Admission Date: 2018     Discharge Date: 2018    Admitting Attending: Geovani Coe MD    Delivering Attending: Dr Tamara Dorantes    Discharging Attending: Dr Eduardo Brown    Principal Diagnosis: Pregnancy at 39w1d    Secondary Diagnosis:   1  IUGR  2  Rubella non-immune    Procedures: spontaneous vaginal delivery    Anesthesia: epidural    Hospital course: Mat Wilcox is a 19 y o  Genette Ada at 36w3d who was initially admitted for induction of labor for IUGR on 18  She was started on straight dose pitocin and received a nieves balloon for cervical ripening  She was then started on pitocin titration and had a prolonged labor course in which pitocin and stopped and restarted multiple times  She was AROMd for pink fluid on 18 and became complete later that afternoon  She delivered a viable female  on 2018 at 26  Weight 5lbs 15oz via normal spontaneous vaginal delivery  She sustained a 1st degree perineal laceration during delivery which did not need repair  Apgars were 8 (1 min) and 9 (5 min)   was transferred to  nursery  Patient tolerated the procedure well  Her post-delivery course was uncomplicated  Her postpartum pain was well controlled with oral analgesics  On day of discharge, she was ambulating and able to reasonably perform all ADLs  She was voiding and had appropriate bowel function  Pain was well controlled  She was discharged home on postpartum day #2 without complications  Patient was instructed to follow up with her OB as an outpatient and was given appropriate warnings to call provider if she develops signs of infection or uncontrolled pain  Complications: none apparent    Condition at discharge: good     Discharge instructions/Information to patient and family:   See after visit summary for information provided to patient and family  Provisions for Follow-Up Care:  See after visit summary for information related to follow-up care and any pertinent home health orders  Disposition: See After Visit Summary for discharge disposition information      Planned Readmission: No

## 2018-11-06 NOTE — OB LABOR/OXYTOCIN SAFETY PROGRESS
Oxytocin Safety Progress Check Note - Ta Taylor 19 y o  female MRN: [id number]    Unit/Bed#: -01 Encounter: [id number]    Obstetric History       T0      L0     SAB0   TAB1   Ectopic0   Multiple0   Live Births0      Gestational Age: 39w1d  Dose (ann-units/min) Oxytocin: 15 ann-units/min  Contraction Frequency (minutes): 2-4  Contraction Quality: Moderate  Tachysystole: No   Dilation: 7        Effacement (%): 100  Station: 2  Baseline Rate: 155 bpm  Fetal Heart Rate: 154 BPM  FHR Category: Category II     Oxytocin Safety Progress Check: Safety check completed    Notes/comments:      still with late decelerations but good return to baseline  Good cervical changes  Continue to watch and anticipate complete dilation       Hernandez Rivera MD 2018 2:51 PM

## 2018-11-06 NOTE — OB LABOR/OXYTOCIN SAFETY PROGRESS
Oxytocin Safety Progress Check Note - Galo Santos 19 y o  female MRN: [id number]    Unit/Bed#: -01 Encounter: [id number]    Obstetric History       T0      L0     SAB0   TAB1   Ectopic0   Multiple0   Live Births0      Gestational Age: 39w1d  Dose (ann-units/min) Oxytocin: 3 ann-units/min (start titrating pitocin at this time per dr Sam Licea order)  Contraction Frequency (minutes): 0  Contraction Quality: Mild  Tachysystole: No   Dilation: 5        Effacement (%): 80  Station: -1  Baseline Rate: 125 bpm  Fetal Heart Rate: 128 BPM  FHR Category: Category I     Oxytocin Safety Progress Check: Safety check completed    Notes/comments:     Patient resting comfortably  SVE deferred  Will start titrating pitocin      Christiano Keen MD 2018 4:20 AM

## 2018-11-06 NOTE — OB LABOR/OXYTOCIN SAFETY PROGRESS
Oxytocin Safety Progress Check Note - Sheffield Leventhal 19 y o  female MRN: [id number]    Unit/Bed#: -01 Encounter: [id number]    Obstetric History       T0      L0     SAB0   TAB1   Ectopic0   Multiple0   Live Births0      Gestational Age: 39w0d  Dose (ann-units/min) Oxytocin: 2 ann-units/min (restarted at this time per dr Bender Matters order  Low dose pitocin protocol )  Contraction Frequency (minutes): 3-5 5  Contraction Quality: Moderate  Tachysystole: No   Dilation: 4-5        Effacement (%): 80  Station: -2  Baseline Rate: 140 bpm  Fetal Heart Rate: 141 BPM  FHR Category: Category I     Oxytocin Safety Progress Check: Safety check completed    Notes/comments:     TERRANCEE 4 /-2, per Dr Bishop Dick  Patient had dinner and showered  Pitocin restarted at , will so straight dose pitocin at 2  Dr Bishop Dick aware      Silke Correia MD 2018 8:35 PM

## 2018-11-06 NOTE — PLAN OF CARE
PAIN - ADULT     Verbalizes/displays adequate comfort level or baseline comfort level Completed          DISCHARGE PLANNING     Discharge to home or other facility with appropriate resources Progressing        INFECTION - ADULT     Absence or prevention of progression during hospitalization Progressing     Absence of fever/infection during neutropenic period Progressing        Knowledge Deficit     Patient/family/caregiver demonstrates understanding of disease process, treatment plan, medications, and discharge instructions Progressing        Labor & Delivery     Manages discomfort Progressing     Patient vital signs are stable Progressing        POSTPARTUM     Experiences normal postpartum course Progressing     Appropriate maternal -  bonding Progressing     Establishment of infant feeding pattern Progressing     Incision(s), wounds(s) or drain site(s) healing without S/S of infection Progressing        SAFETY ADULT     Patient will remain free of falls Progressing     Maintain or return to baseline ADL function Progressing     Maintain or return mobility status to optimal level Progressing

## 2018-11-07 PROCEDURE — 99024 POSTOP FOLLOW-UP VISIT: CPT | Performed by: OBSTETRICS & GYNECOLOGY

## 2018-11-07 RX ADMIN — Medication 1 TABLET: at 09:06

## 2018-11-07 RX ADMIN — IBUPROFEN 400 MG: 400 TABLET ORAL at 20:27

## 2018-11-07 RX ADMIN — IBUPROFEN 400 MG: 400 TABLET ORAL at 09:05

## 2018-11-07 NOTE — LACTATION NOTE
This note was copied from a baby's chart  CONSULT - LACTATION  Baby Girl  Vanessa Stoddard Mg Pounds 0 days female MRN: [id number]    [facility] Room / Bed: (N)/(N) Encounter: [id number]    Maternal Information     MOTHER:  Doc Momin  Maternal Age: 19 y o    OB History: #: 1, Date: 17, Sex: None, Weight: None, GA: None, Delivery: Therapeutic , Apgar1: None, Apgar5: None, Living: None, Birth Comments: None    #: 2, Date: 18, Sex: Female, Weight: 2693 g (5 lb 15 oz), GA: 39w1d, Delivery: Vaginal, Spontaneous Delivery, Apgar1: 8, Apgar5: 9, Living: Living, Birth Comments: None   Previouse breast reduction surgery? No    Lactation history:   Has patient previously breast fed: No   How long had patient previously breast fed:     Previous breast feeding complications:       Past Surgical History:   Procedure Laterality Date    INDUCED       WISDOM TOOTH EXTRACTION         Birth information:  YOB: 2020   Time of birth: 3:59 PM   Sex: female   Delivery type: Vaginal, Spontaneous Delivery   Birth Weight: 2693 g (5 lb 15 oz)   Percent of Weight Change: 0%     Gestational Age: 36w3d   [unfilled]    Assessment     Breast and nipple assessment: normal assessment     Assessment: normal assessment    Feeding assessment: feeding well  LATCH:  Latch: Grasps breast, tongue down, lips flanged, rhythmic sucking   Audible Swallowing: Spontaneous and intermittent (24 hours old)   Type of Nipple: Everted (After stimulation)   Comfort (Breast/Nipple): Soft/non-tender   Hold (Positioning): Full assist, teach one side, mother does other, staff holds   LATCH Score: 9          Feeding recommendations:  breast feed on demand     Discussed 2nd night syndrome and ways to calm infant  Hand out given  Information on hand expression given   Discussed benefits of knowing how to manually express breast including stimulating milk supply, softening nipple for latch and evacuating breast in the event of engorgement  Effective return demonstration on hand expression  Met with mother  Provided mother with Ready, Set, Baby booklet  Discussed Skin to Skin contact an benefits to mom and baby  Talked about the delay of the first bath until baby has adjusted  Spoke about the benefits of rooming in  Feeding on cue and what that means for recognizing infant's hunger  Avoidance of pacifiers for the first month discussed  Talked about exclusive breastfeeding for the first 6 months  Positioning and latch reviewed as well as showing images of other feeding positions  Discussed the properties of a good latch in any position  Reviewed hand/manual expression  Discussed s/s that baby is getting enough milk and some s/s that breastfeeding dyad may need further help  Gave information on common concerns, what to expect the first few weeks after delivery, preparing for other caregivers, and how partners can help  Resources for support also provided  Gave suggestions on how to accomplish deep latch by starting latch with infant's nose at the nipple  Then, stroke the upper lip with the nipple  As infant opens mouth, apply the areola and nipple on on top of the tongue so that the nipple impacts with the soft palate to increase comfort with the feeding and to keep infant interested in the feeding longer  Spent time working on different positions that would facilitate better transfer of breastmilk  Encouraged family to watch breastfeeding class in the education area of My Chart Bedside  Power point handout for breastfeeding class in My Chart Bedside given to family so they can follow along and write down questions they may have  Encouraged Jace Olivo to call for assistance, questions, and concerns about breastfeeding  Extension provided    Tania Fleming RN 11/6/2018 7:13 PM

## 2018-11-07 NOTE — PROGRESS NOTES
Progress Note - OB/GYN   [place] Linda 19 y o  female MRN: [id number]  Unit/Bed#: -01 Encounter: [id number]    Assessment:  Post partum Day #1  s/pSVD, stable    Plan:  1) Continue routine post partum care   Encourage ambulation   Encourage breastfeeding   Anticipate discharge 11/8/18     Subjective/Objective   Chief Complaint:     Post delivery  Patient is doing well  Lochia WNL  Pain well controlled  Patient has showered and feeling well this morning  Subjective:     Pain: yes, cramping, improved with meds  Tolerating PO: yes  Voiding: yes  Flatus: yes  BM: no  Ambulating: yes  Breastfeeding:  yes  Chest pain: no  Shortness of breath: no  Leg pain: no  Lochia: minimal    Objective:     Vitals: BP 96/59 (BP Location: Right arm)   Pulse 95   Temp 98 7 °F (37 1 °C) (Oral)   Resp 18   Ht 5' 2" (1 575 m)   Wt 73 5 kg (162 lb)   LMP  (LMP Unknown)   SpO2 97%   Breastfeeding? Yes   BMI 29 63 kg/m²       Intake/Output Summary (Last 24 hours) at 11/07/18 0658  Last data filed at 11/06/18 2200   Gross per 24 hour   Intake                0 ml   Output             1950 ml   Net            -1950 ml       Lab Results   Component Value Date    WBC 11 87 (H) 11/04/2018    HGB 11 0 (L) 11/04/2018    HCT 33 0 (L) 11/04/2018    MCV 89 11/04/2018     11/04/2018       Physical Exam:     Gen: AAOx3, NAD  CV: RRR  Lungs: CTA b/l  Abd: Soft, non-tender, non-distended, no rebound or guarding  Uterine fundus firm and non-tender, -1 cm below the umbilicus     Ext: Non tender    Oanh Meredith MD  11/7/2018  6:58 AM

## 2018-11-07 NOTE — LACTATION NOTE
This note was copied from a baby's chart  Called for feeding assistance at this time  Infant has been having intermittent low BS  Sleepy at the breast, assisted Mom will getting a deep latch to start and discussed hand expression in order to offer drops of milk to infant  Infant latches at this time and maintains suck  Enc to call for additional lactation support as needed

## 2018-11-08 ENCOUNTER — DOCUMENTATION (OUTPATIENT)
Dept: LABOR AND DELIVERY | Facility: HOSPITAL | Age: 18
End: 2018-11-08

## 2018-11-08 VITALS
RESPIRATION RATE: 18 BRPM | TEMPERATURE: 98.7 F | DIASTOLIC BLOOD PRESSURE: 69 MMHG | OXYGEN SATURATION: 97 % | BODY MASS INDEX: 29.81 KG/M2 | HEART RATE: 96 BPM | WEIGHT: 162 LBS | SYSTOLIC BLOOD PRESSURE: 113 MMHG | HEIGHT: 62 IN

## 2018-11-08 PROCEDURE — 90707 MMR VACCINE SC: CPT | Performed by: OBSTETRICS & GYNECOLOGY

## 2018-11-08 RX ORDER — ACETAMINOPHEN 325 MG/1
650 TABLET ORAL EVERY 6 HOURS PRN
Qty: 30 TABLET | Refills: 0 | Status: CANCELLED | OUTPATIENT
Start: 2018-11-08

## 2018-11-08 RX ORDER — DIAPER,BRIEF,INFANT-TODD,DISP
1 EACH MISCELLANEOUS 4 TIMES DAILY PRN
Qty: 30 G | Refills: 0
Start: 2018-11-08 | End: 2018-11-21 | Stop reason: ALTCHOICE

## 2018-11-08 RX ORDER — IBUPROFEN 200 MG
400 TABLET ORAL EVERY 6 HOURS PRN
Qty: 30 TABLET | Refills: 0 | Status: CANCELLED | OUTPATIENT
Start: 2018-11-08

## 2018-11-08 RX ORDER — IBUPROFEN 400 MG/1
400 TABLET ORAL EVERY 4 HOURS PRN
Qty: 30 TABLET | Refills: 0
Start: 2018-11-08 | End: 2018-11-21 | Stop reason: ALTCHOICE

## 2018-11-08 RX ADMIN — Medication 1 TABLET: at 09:13

## 2018-11-08 RX ADMIN — MEASLES, MUMPS, AND RUBELLA VIRUS VACCINE LIVE 0.5 ML: 1000; 12500; 1000 INJECTION, POWDER, LYOPHILIZED, FOR SUSPENSION SUBCUTANEOUS at 16:24

## 2018-11-08 NOTE — PLAN OF CARE
DISCHARGE PLANNING     Discharge to home or other facility with appropriate resources Completed        INFECTION - ADULT     Absence or prevention of progression during hospitalization Completed     Absence of fever/infection during neutropenic period Completed        Knowledge Deficit     Patient/family/caregiver demonstrates understanding of disease process, treatment plan, medications, and discharge instructions Completed        Labor & Delivery     Manages discomfort Completed     Patient vital signs are stable Completed        POSTPARTUM     Experiences normal postpartum course Completed     Appropriate maternal -  bonding Completed     Establishment of infant feeding pattern Completed     Incision(s), wounds(s) or drain site(s) healing without S/S of infection Completed        SAFETY ADULT     Patient will remain free of falls Completed     Maintain or return to baseline ADL function Completed     Maintain or return mobility status to optimal level Completed

## 2018-11-08 NOTE — LACTATION NOTE
This note was copied from a baby's chart  Brendan Sahu has much support from her family with breastfeeding  She has been supplementing with donor milk and is going home with donor milk until Ewa's more mature breast milk comes in  She feeding for 15 minutes per side and following up with 15 ml's of donor milk  Initiated pumping today  Supplementation has been happening with a supplemental nursing system which Brendan Sahu wishes to continue using with appropriate teaching about limitations of use  Scheduled follow up appointment for Brendan Sahu and her baby tomorrow morning  at [address]  Met with mother to go over feeding log since birth for the first week  Emphasized 8 or more (12) feedings in a 24 hour period, what to expect for the number of diapers per day of life and the progression of properties of the  stooling pattern  Discussed s/s that breastfeeding is going well after day 4 and when to get help from a pediatrician or lactation support person after day 4  Booklet included Breast Pumping Instructions, When You Go Back to Work or School, and Breastfeeding Resources for after discharge including access to the number for the [address]  Powerpoints given on mom/ care class and breastfeeding class at patient request     Feed infant at breast  Offer donor milk as ordered via finger feeding with SNS  Pump after feedings,  every two to three hours to provide breastmilk with next occurrence at the breast and protect/establish supply  Encouraged MOB to call for assistance, questions, and concerns about breastfeeding  Extension provided

## 2018-11-08 NOTE — LACTATION NOTE
This note was copied from a baby's chart  Arrangements made with Nasir Richey to provide Ewa with 10 bottles (100mL each) of pasteurized human donor milk from NICU stock  Prescription and payment information forms faxed to [address]  Family provided with information on importance of pumping to help with milk supply  Mother has an appointment with the lactation consultant at the [facility] and [address] [place] [address] Friday November 9th for continued support and latch evaluation  Information also provided on storage and thawing of pasteurized human donor milk

## 2018-11-08 NOTE — PROGRESS NOTES
Progress Note - OB/GYN   [facility] [place] 19 y o  female MRN: [id number]  Unit/Bed#: -01 Encounter: [id number]    Assessment:  Post partum Day #2 s/p , stable, baby in room    Plan:  1) Rubella non-immune   - MMR ordered, needs to be administered prior to discharge  2) Continue routine post partum care   Encourage ambulation   Encourage breastfeeding   Anticipate discharge home today     Subjective/Objective   Chief Complaint:     Post delivery  Patient is doing well  Lochia WNL  Pain well controlled  Subjective:     Pain: yes, cramping, improved with meds  Tolerating PO: yes  Voiding: yes  Ambulating: yes  Breastfeeding:  yes  Chest pain: no  Shortness of breath: no  Leg pain: no  Lochia: WNL    Objective:     Vitals: /71   Pulse 66   Temp 97 8 °F (36 6 °C) (Oral)   Resp 16   Ht 5' 2" (1 575 m)   Wt 73 5 kg (162 lb)   LMP  (LMP Unknown)   SpO2 97%   Breastfeeding? Yes   BMI 29 63 kg/m²     No intake or output data in the 24 hours ending 18 0443    Lab Results   Component Value Date    WBC 11 87 (H) 2018    HGB 11 0 (L) 2018    HCT 33 0 (L) 2018    MCV 89 2018     2018       Physical Exam:     Gen: AAOx3, NAD  CV: RRR  Lungs: CTA b/l  Abd: Soft, non-tender, non-distended, no rebound or guarding  Uterine fundus firm and non-tender, 1 cm below the umbilicus     Ext: Non tender      Juan Luis Stephenson MD  OB/GYN PGY-1  2018  4:43 AM

## 2018-11-09 ENCOUNTER — OFFICE VISIT (OUTPATIENT)
Dept: POSTPARTUM | Facility: CLINIC | Age: 18
End: 2018-11-09
Payer: COMMERCIAL

## 2018-11-09 VITALS — SYSTOLIC BLOOD PRESSURE: 110 MMHG | DIASTOLIC BLOOD PRESSURE: 72 MMHG

## 2018-11-09 DIAGNOSIS — R52 PAIN AGGRAVATED BY BREAST FEEDING: ICD-10-CM

## 2018-11-09 DIAGNOSIS — O92.79 PAIN AGGRAVATED BY BREAST FEEDING: ICD-10-CM

## 2018-11-09 DIAGNOSIS — O92.13 CRACKED NIPPLE ASSOCIATED WITH LACTATION: ICD-10-CM

## 2018-11-09 DIAGNOSIS — Z71.89 ENCOUNTER FOR BREAST FEEDING COUNSELING: Primary | ICD-10-CM

## 2018-11-09 PROCEDURE — 99404 PREV MED CNSL INDIV APPRX 60: CPT | Performed by: PEDIATRICS

## 2018-11-09 NOTE — PATIENT INSTRUCTIONS
Continue to offer the breast on demand, paying close attention to positioning for a better latch  Compress your breast to make it narrow in the same direction your baby's  mouth is positioned  Move your baby onto your breast so her chin touches first and her head tips back  Your nipple should be at her nose  When she opens her mouth wide, move her onto the breast so your nipple enters her mouth pointing upward  Her mouth should appear wide open once she is latched with her upper and lower lips flanged outward  The latch should not hurt beyond the first few seconds  Until effective breastfeeding is well established, continue to supplement with expressed or donor milk via finger feeding or paced bottle feeding as needed  Frequent and effective pumping will help establish your milk supply and provide milk for [place] until she is nursing well  When pumping, Cycle your pump through stimulation and expression mode several times in a session to stimulate several let downs  Use hands on pumping and hand expression to increase your output  Maintain your pump as recommended  Call [facility] today for a script for a breast pump  Try to  today if possible as this will provide more effective pumping  Contact Dr Joi Shi at [address] [facility] today to schedule an appointment to evaluate her tongue and for possible frenotomy  To help your nipples heal, in addition to paying close attention to latch, apply protective ointment after feeding or pumping and cover with an occlusive dressing like wax paper  Do this until your nipples have completely healed  Please call with any additional questions or concerns

## 2018-11-09 NOTE — PROGRESS NOTES
Collaboration of donor breastmilk for patient to take home with Levon Palma, Vasu Maza CNM NNP, and Dr Brandt prior to discharge home this evening  Donor breastmilk 10 bottles sent home with patient, she has appointment tomorrow at [facility]  Patient encouraged to pump breasts as educated

## 2018-11-09 NOTE — PROGRESS NOTES
INITIAL BREAST FEEDING EVALUATION    Informant/Relationship: Ewa    Discussion of General Lactation Issues: Kayley weighed less than 6 pounds at birth  She is being supplemented with HDM until mom's milk comes in  Since getting home yesterday, there have been some struggles getting Kayley to latch  She has had a few good feedings at the breast    Infant is 1days old today   History:  Fertility Problem:no  Breast changes:yes - breasts got much beasley  : yes - induced due to IUGR  Full term:yes - 39 weeks   labor:no  First nursing/attempt < 1 hour after birth:yes - baby was able to latch briefly  Skin to skin following delivery:yes - brief interruption to evaluate baby  Mom was wearing a bra during  STS  Breast changes after delivery:no  Rooming in (infant in room with mother with exception of procedures, eg  Circumcision: no  Went to the nursery several times at mom's request and due to baby's temperature  Blood sugar issues:yes - the first day  NICU stay:no  Jaundice:no  Phototherapy:no  Supplement given: (list supplement and method used as well as reason(s):yes - expressed colostrum and DHM via finger feeding  Past Medical History:   Diagnosis Date    IBS (irritable bowel syndrome)     Pregnancy     Varicella     as child         Current Outpatient Prescriptions:     benzocaine-menthol-lanolin-aloe (DERMOPLAST) 20-0 5 % topical spray, Apply 1 application topically 4 (four) times a day as needed for mild pain, Disp: 1 each, Rfl: 0    hydrocortisone 1 % cream, Apply 1 application topically 4 (four) times a day as needed for irritation, Disp: 30 g, Rfl: 0    ibuprofen (MOTRIN) 400 mg tablet, Take 1 tablet (400 mg total) by mouth every 4 (four) hours as needed for mild pain, Disp: 30 tablet, Rfl: 0    Prenatal MV-Min-Fe Fum-FA-DHA (PRENATAL 1 PO), Take by mouth, Disp: , Rfl:   No current facility-administered medications for this visit       No Known Allergies    History   Drug Use No       Social History Never a smoker    Interval Breastfeeding History:    Frequency of breast feeding: Attempting every 2-2 5 hours on a schedule for now  Has had a few successful feedings at the breast  Does mother feel breastfeeding is effective: No  Does infant appear satisfied after nursing:No  Stooling pattern normal: Yes  Urinating frequently:Yes  Using shield or shells: No    Alternative/Artificial Feedings:   Bottle: No  Cup: No  Syringe/Finger: Yes, after feeding at the breast           Formula Type: none                     Amount: n/a            Breast Milk:                      Amount: 20ml            Frequency Q 2 Hr between feedings  Elimination Problems: No      Equipment:  Nipple Shield             Type: none             Size: n/a             Frequency of Use: n/a  Pump            Type: Evenflo            Frequency of Use: Every 2-2 5 hours  Expressing drops  Shells            Type: none            Frequency of use: n/a    Equipment Problems: no    Mom:  Breast: Medium size slightly asymmetric breasts L>R  Filling  Nipple Assessment in General: Everted nipples  Scabs on the face of both nipples  Ecchymotic area on the left areola  Mother's Awareness of Feeding Cues                 Recognizes: Yes                  Verbalizes: Yes  Support System: FOB, extended family  History of Breastfeeding: none  Changes/Stressors/Violence: Afshan Stone is very calm but concerned about the breastfeeding difficulties  Concerns/Goals: Afshan Stone would like to breastfeed for at least a year  Problems with Mom: Painful damaged nipples  Physical Exam   Constitutional: She is oriented to person, place, and time  She appears well-developed and well-nourished  HENT:   Head: Normocephalic and atraumatic  Neck: Normal range of motion  Cardiovascular: Normal rate, regular rhythm and normal heart sounds  Pulmonary/Chest: Effort normal and breath sounds normal    Musculoskeletal: Normal range of motion  Neurological: She is alert and oriented to person, place, and time  Skin: Skin is warm and dry  Psychiatric: She has a normal mood and affect  Her behavior is normal  Judgment and thought content normal        Infant:  Behaviors: Alert  Color: Jaundice  Birth weight: 2693gram  Current weight: 2435gram    Problems with infant: Slow weight gain, can't latch or suckle effectively      General Appearance:  Alert, active, no distress                            Head:  Normocephalic, AFOF, sutures opposed                            Eyes:   Conjunctiva clear, no drainage                            Ears:   Normally placed, no anomolies                           Nose:   no drainage or erythema                          Mouth:  No lesions  High anterior palate  See [place]aker assessment                   Neck:  Supple, symmetrical, trachea midline                Respiratory:  No grunting, flaring, retractions, breath sounds clear and equal           Cardiovascular:  Regular rate and rhythm  No murmur  Adequate perfusion/capillary refill   Femoral pulse present                  Abdomen:    Soft, non-tender, no masses, bowel sounds present, no HSM            Genitourinary:  Normal female genitalia, anus patent                         Spine:   No abnormalities noted       Musculoskeletal:   Full range of motion         Skin/Hair/Nails:   Skin warm, dry, and intact, no rashes or abnormal dyspigmentation or lesions               Neurologic:   No abnormal movement, tone appropriate for gestational age    [facility] Assessment for Lingual Frenulum Function    Appearance Items Function Items   Appearance of tongue when lifted  0: Heart- or V-shaped   Lateralization  1: Body of tongue but not tongue tip   Elasticity of frenulum  1: Moderately elastic   Lift of tongue  1: Only edges to mid-mouth     Length of lingual frenulum when tongue lifted  lingual frenulum length: 1: 1 cm     Extension of tongue  0: Neither of the above, or anterior or mid-tongu humps   Attachment of lingual frenulum to tongue  2: Posterior to tip   Spread of anterior tongue  0: Little or None   Attachment of lingual frenulum to inferior alveolar ridge  1: Attached just below ridge Cupping  1: Side edges only, moderate cup   Ankyloglossia Grading:  Class I: mild, 12-16 mm  Class II: moderate, 8-11 mm  Class III: severe, 3-7 mm  ClassIV: complete, less than 3 mm Peristalsis  1: Partial, originating posterior to tip       SCORE:    Appearance: 5 (<8=ankyloglossia)  Function: 5 (<11=ankyloglossia) Snapback  1: Periodic         [place] Latch:  Efficiency:               Lips Flanged: Yes              Depth of latch: good              Audible Swallow: Yes, a couple              Visible Milk: Yes              Wide Open/ Asymmetrical: Yes              Suck Swallow Cycle: Breathing: unlabored, Coordinated: yes  Nipple Assessment after latch: vertical compression of the nipple  Latch Problems: Initially, Celeste Devlin was too upset to latch after her assessment was completed  Ewa arias fed her some donor milk and when Celeste Devlin was much calmer, she latched well to the right breast and suckled for a few minutes  A few swallows were heard  Daniela Coleman reported that the latch was more comfortable than it has ever been  I supported Daniela Coleman with correct positioning and how to compress the breast       Position:  Infant's Ergonomics/Body               Body Alignment: Yes               Head Supported: Yes               Close to Mom's body/ Lifted/ Supported: Yes               Mom's Ergonomics/Body: Yes                           Supported:  Yes                           Sitting Back: Yes                           Brings Baby to her breast: Yes  Positioning Problems: No      Handouts:   Paced bottle feeding, Hands on pumping, Hand expression and Latch Check List    Education:  Reviewed Latch: Demonstrated how to gently compress the breast and align the baby so that her nose is just above the nipple with her lower lip and chin touching the breast to encourage the deepest, widest, off-center latch  Reviewed Positioning for Dyad: Demonstrated cross cradle and football positions  Reviewed Frequency/Supply & Demand: Discussed how frequently and effectively emptying/stimulating the breasts by pumping until breastfeeding is established will help establish an abundant milk supply  Reviewed Infant:Cues and varied States of Awareness  Reviewed Infant Elimination: Watch for 5 wet diapers and 3-4 soiled diapers today  Reviewed Alternative/Artificial Feedings: Discussed and demonstrated paced bottle feeding  Reviewed Mom/Breast care: Discussed moist wound healing  Reviewed Equipment: Discussed the elements of hands on pumping  Plan:  Plan for breastfeeding    Reassurance and support given  Reviewed normal sucking patterns: transition from stimulation to nutritive to release or non-nutritive  Watch for sustained periods of active suckling and swallowing  Reviewed normal nursing pattern: infant should nurse for at least 5 minutes or until releases on own  Discussed difference in sensation of non-nutritive v nutritive sucking  Supplementation recommended (document method-education if necessary)  Expressed breast milk or donor milk via finger feeding or paced bottle feeding as needed  Use of pump demonstrated  Demonstrated hands on pumping            I have spent 90 minutes with Patient and family today in which greater than 50% of this time was spent in counseling/coordination of care regarding Patient and family education

## 2018-11-12 ENCOUNTER — OFFICE VISIT (OUTPATIENT)
Dept: POSTPARTUM | Facility: CLINIC | Age: 18
End: 2018-11-12
Payer: COMMERCIAL

## 2018-11-12 DIAGNOSIS — O92.70 LACTATION PROBLEM: ICD-10-CM

## 2018-11-12 DIAGNOSIS — O92.79 PAIN AGGRAVATED BY BREAST FEEDING: ICD-10-CM

## 2018-11-12 DIAGNOSIS — Z71.89 ENCOUNTER FOR BREAST FEEDING COUNSELING: Primary | ICD-10-CM

## 2018-11-12 DIAGNOSIS — R52 PAIN AGGRAVATED BY BREAST FEEDING: ICD-10-CM

## 2018-11-12 PROCEDURE — 99404 PREV MED CNSL INDIV APPRX 60: CPT | Performed by: PEDIATRICS

## 2018-11-12 NOTE — PROGRESS NOTES
BREAST FEEDING FOLLOW UP VISIT    Informant/Relationship: Ewa    Discussion of General Lactation Issues: Loree Ackerman saw Dr Andrei Boswell for a frenotomy on Friday  Rosemary Bass does not feel things have gotten much better  Loree Ackerman will only latch and suckle about once a day in spite of Ewa's efforts  She is pumping to establish her supply but only able to express about an ounce each session so far  She is supplementing with donor human milk via paced bottle feeding as well  However, she will likely run out of donor milk today  Infant is 10days old today  Interval Breastfeeding History:    Frequency of breast feeding: Every 2 hours on a schedule right now  Does mother feel breastfeeding is effective: No  Does infant appear satisfied after nursing:No  Stooling pattern normal:Yes  Urinating frequently:Yes  Using shield or shells:No    Alternative/Artificial Feedings:   Bottle: Yes, currently for every feeding  Cup: No  Syringe/Finger: No           Formula Type: none so far                     Amount: n/a            Breast Milk:                      Amount: 45-60ml            Frequency Q 2 Hr between feedings  Elimination Problems: No      Equipment:  Nipple Shield             Type: none             Size: n/a             Frequency of Use: n/a  Pump            Type: Evenflo (waiting for Spectra)            Frequency of Use: 8 times a day  Expresses about an ounce each feeding  Shells            Type: none            Frequency of use: n/a    Equipment Problems: no      Mom:  Breast: Medium sized slightly asymmetrical breasts  Nipple Assessment in General: Everted nipples  Scabs on the face healing and much improved from previous assessment  Mother's Awareness of Feeding Cues                 Recognizes:  Yes                  Verbalizes: Yes  Support System: FOB, extended family  History of Breastfeeding: none  Changes/Stressors/Violence: Rosemary Bass is stressed by breastfeeding difficulties and Kayley's slow weightgain  Concerns/Goals: Rosemary Patience would like to breastfeed for at least a year  Problems with Mom: ? Decreased milk supply    Physical Exam   Constitutional: She is oriented to person, place, and time  She appears well-developed and well-nourished  HENT:   Head: Normocephalic and atraumatic  Neck: Normal range of motion  Cardiovascular: Normal rate, regular rhythm and normal heart sounds  Pulmonary/Chest: Effort normal and breath sounds normal    Musculoskeletal: Normal range of motion  Neurological: She is alert and oriented to person, place, and time  Skin: Skin is warm and dry  Psychiatric: She has a normal mood and affect  Her behavior is normal  Judgment and thought content normal        Infant:  Behaviors: Sleepy  Color: Jaundice  Birth weight: 2693gram  Current weight: 2445gram    Problems with infant: Slow weight gain, won't latch or suckle consistently or effectively      General Appearance:  Alert, active, no distress                            Head:  Normocephalic, AFOF, sutures opposed                            Eyes:   Conjunctiva clear, no drainage                            Ears:   Normally placed, no anomolies                           Nose:   no drainage or erythema                          Mouth:  No lesions  Healing frenotomy wound under tongue  Still a fair amount of intact frenulum noted  Oral function not improved from first assessment  Neck:  Supple, symmetrical, trachea midline                Respiratory:  No grunting, flaring, retractions, breath sounds clear and equal           Cardiovascular:  Regular rate and rhythm  No murmur  Adequate perfusion/capillary refill   Femoral pulse present                  Abdomen:    Soft, non-tender, no masses, bowel sounds present, no HSM            Genitourinary:  Normal female genitalia, anus patent                         Spine:   No abnormalities noted       Musculoskeletal:   Full range of motion Skin/Hair/Nails:   Skin warm, dry, and intact, no rashes  Facial jaundice               Neurologic:   No abnormal movement, tone appropriate for gestational age    [place] Latch:  Ramona Pena was very sleepy and made no attempt to nurse while here  [facility] skin to skin and other means of waking Kayley but she would not wake up  They will try again once they get home  Position:  Infant's Ergonomics/Body               Body Alignment: Yes               Head Supported: Yes               Close to Mom's body/ Lifted/ Supported: Yes               Mom's Ergonomics/Body: Yes                           Supported: Yes                           Sitting Back: Yes                           Brings Baby to her breast: Yes  Positioning Problems: None      Handouts: Increasing your supply    Education:  Reviewed positioning, latch, supply and demand, hands on pumping and hand expression  Reviewed what sucking normal looks and feels like compared to how Kayley sucks      Plan:  Plan for breastfeeding    Reassurance and support given  Reviewed normal sucking patterns: transition from stimulation to nutritive to release or non-nutritive  When Ramona Pena is latched effectively, she will suck and swallow frequently and rhythmicall when she is actively feeding  Reviewed normal nursing pattern: infant should nurse for at least 5 minutes or until releases on own  Discussed difference in sensation of non-nutritive v nutritive sucking  Galactogogues discuseed (note if fenugeek or mother's milk tea  Hand out given regarding foods and supplements that may help with supply  Discussed re-evaluating Kayley's tongue to see if any additional intervention is needed  I have spent 60 minutes with Patient and family  today in which greater than 50% of this time was spent in counseling/coordination of care regarding Patient and family education

## 2018-11-16 LAB — PLACENTA IN STORAGE: NORMAL

## 2018-11-21 ENCOUNTER — OFFICE VISIT (OUTPATIENT)
Dept: POSTPARTUM | Facility: CLINIC | Age: 18
End: 2018-11-21
Payer: COMMERCIAL

## 2018-11-21 VITALS — DIASTOLIC BLOOD PRESSURE: 64 MMHG | SYSTOLIC BLOOD PRESSURE: 118 MMHG

## 2018-11-21 DIAGNOSIS — N64.4 MASTODYNIA: ICD-10-CM

## 2018-11-21 DIAGNOSIS — O92.79 NURSING DIFFICULTY: ICD-10-CM

## 2018-11-21 DIAGNOSIS — O92.5 SUPPRESSED LACTATION: Primary | ICD-10-CM

## 2018-11-21 PROCEDURE — 99215 OFFICE O/P EST HI 40 MIN: CPT | Performed by: PEDIATRICS

## 2018-11-21 NOTE — PROGRESS NOTES
BREAST FEEDING FOLLOW UP VISIT    Informant/Relationship: Ewa/mom    Discussion of General Lactation Issues: Lucio Banks wasn't latching well at the beginning  Lucio Banks was diagnosed with a tongue tie and had a frenotomy  Since the frenotomy, she can latch, but latch and nursing are painful  [employer] also thinks she isn't nursing well  She only nurses for 10 minutes on each side and still seems hungry  At this time, Steven Ellsworth to the the breast 2 x/day followed by supplement and then gives her a combination of EBM and formula in the breast the rest of the day  Infant is 3weeks old today  Interval Breastfeeding History:    Frequency of breast feedin x/day  Does mother feel breastfeeding is effective: If no, explain: needs supplement following nursing  Does infant appear satisfied after nursing:If no, explain: needs supplement following nursing  Stooling pattern normal:Yes  Urinating frequently:Yes  Using shield or shells:Yes, tried shields, baby didn't seem to be a big fan    Alternative/Artificial Feedings:   Bottle: Yes, for EBM and formula  Cup: N/A  Syringe/Finger: N/A           Formula Type: Enfamill GentleEase                     Amount: 2-3 oz            Breast Milk:                      Amount: Whatever she can pump            Frequency Q 2-3 Hr between feedings  Elimination Problems: No      Equipment:  Nipple Shield             Type: n/a             Size: n/a             Frequency of Use: n/a  Pump            Type: Spectra            Frequency of Use: about every 2-3 hours  Shells            Type: n/a            Frequency of use: n/a    Equipment Problems: no      Mom:  Breast: Normal  Nipple Assessment in General: Normal: elongated/eraser, no discoloration and no damage noted  Mother's Awareness of Feeding Cues                 Recognizes:  Yes                  Verbalizes: Yes  Support System: FOB  History of Breastfeeding: none  Changes/Stressors/Violence: Pain with nursing, need to supplement  Concerns/Goals: Chantel Reis would like to nurse exclusively    Problems with Mom: Pain with nursing    Physical Exam   Constitutional: She is oriented to person, place, and time  She appears well-developed and well-nourished  Neck: Normal range of motion  Neck supple  No thyromegaly present  Cardiovascular: Normal rate, regular rhythm, normal heart sounds and intact distal pulses  No murmur heard  Pulmonary/Chest: Effort normal and breath sounds normal    Musculoskeletal: She exhibits no edema or tenderness  Lymphadenopathy:     She has no cervical adenopathy  She has no axillary adenopathy  Right axillary: No pectoral adenopathy present  Left axillary: No pectoral adenopathy present  Neurological: She is alert and oriented to person, place, and time  Psychiatric: She has a normal mood and affect  Her behavior is normal  Judgment and thought content normal        Infant:  Behaviors: Alert  Color: Pink  Birth weight: 2 693kg  Current weight: 2 85kg    Problems with infant: Tongue tie      General Appearance:  Alert, active, no distress                            Head:  Normocephalic, AFOF, sutures opposed                            Eyes:   Conjunctiva clear, no drainage                            Ears:   Normally placed, no anomolies                           Nose:   Septum intact, no drainage or erythema                          Mouth:  No lesions; residual frenulum felt and visualized with "speed bump" palpated; tongue with decreased lift and twisting to lateralize, extension does occur to over alveolar ridge                   Neck:  Supple, symmetrical, trachea midline, no adenopathy                Respiratory:  No grunting, flaring, retractions, breath sounds clear and equal           Cardiovascular:  Regular rate and rhythm  No murmur  Adequate perfusion/capillary refill   Femoral pulse present                  Abdomen:    Soft, non-tender, no masses, bowel sounds present, no HSM            Genitourinary:  Normal female genitalia                         Spine:   No abnormalities noted       Musculoskeletal:   Full range of motion         Skin/Hair/Nails:   Skin warm, dry, and intact, no rashes or abnormal dyspigmentation or lesions               Neurologic:   No abnormal movement, tone appropriate for gestational age     Latch:  Efficiency:               Lips Flanged: Yes              Depth of latch: Good              Audible Swallow: Yes              Visible Milk: Yes              Wide Open/ Asymmetrical: Yes              Suck Swallow Cycle: Breathing: Unlabored, Coordinated: Yes  Nipple Assessment after latch: Normal: elongated/eraser, no discoloration and no damage noted  Latch Problems: Residual tongue tie, Ewa needs reminders to gently compress the breast and bring [place] up with her lower lip and chin touching the breast so that her nose is just above the nipple to encourage the widest, deepest, most asymmetric lathc  Position:  Infant's Ergonomics/Body               Body Alignment: Yes               Head Supported: Yes               Close to Mom's body/ Lifted/ Supported: Yes               Mom's Ergonomics/Body: Yes                           Supported: Yes                           Sitting Back: Yes                           Brings Baby to her breast: Yes  Positioning Problems: None          Education:  Reviewed Latch: Reviewed how to gently compress the breast to enhance the depth of the latch  Reviewed Positioning for Dyad: Reviewed how to position the baby so that her lower lip and chin touch the breast with her nose just above the nipple to increase the width and asymmetry of the latch  Reviewed Alternative/Artificial Feedings: Recommended continued Paced bottle feeding     Reviewed Mom/Breast care: Recommended continued expression of breast milk about every 3 hours in place of nursing if the baby is not put to the breast         Plan:  Discussed history and physical exams with Jimbo Stephens  Reassured her that the Ferdinand Warren is gaining weight well  Reviewed how to get the best latch for most comfort and best ability to empty the breast  Recommended nursing on demand, focusing on the baby's cues, then cues from her breasts regarding fullness, and finally using the clock if necessary to maintain frequent feeding  Discussed the use of an herbal galactogue, including dosing  Ferdinand Warren does still have some restrictive tongue movement, but a good latch seems in reach  Will consider a frenotomy at a recheck in 1 week if nursing does not improve  I have spent 45 minutes with Patient  today in which greater than 50% of this time was spent in counseling/coordination of care regarding Prognosis, Intructions for management, Patient and family education and Impressions

## 2018-11-21 NOTE — PATIENT INSTRUCTIONS
Try to nurse on demand: Gently compress the breast as if offering a sandwich  Place the baby so that her lower lip and chin touch the breast with her nose just above the nipple  This should encourage a wider, deeper, more off-center, and more comfortable latch  Nursing may occur as often as every hour to 3 on average  Do not let her go longer than 3 hours between feedings during the day or 5 hours between feedings at night  Feed Kayley when she seems hungry, when your breasts are full, or when the "time is up"  The first clues come from her  If she doesn't give any, follow your breasts clues  Only use the clock when neither Dewitte Rich nor your body tell you it is time  Pump after nursing, but at least every 3 hours while awake  If nursing on demand becomes to painful or nursing on demand and pumping afterwards is too demanding, continue the nursing and pumping you were doing before this visit  Moringa (also known as Malunggay or Verdene Marines) can be taken to increase milk production  The dose is up to 3 caps up to 3 x/day  This product can be found on line, though [employer] for example, or at [employer] in [place], or at [employer] in [facility] [address]  Return in 1 week  If nursing is not improved, we can discuss another frenotomy for Kayley

## 2018-11-27 ENCOUNTER — OFFICE VISIT (OUTPATIENT)
Dept: POSTPARTUM | Facility: CLINIC | Age: 18
End: 2018-11-27

## 2018-11-27 VITALS — DIASTOLIC BLOOD PRESSURE: 74 MMHG | SYSTOLIC BLOOD PRESSURE: 122 MMHG

## 2018-11-27 DIAGNOSIS — N64.4 MASTODYNIA: Primary | ICD-10-CM

## 2018-11-27 NOTE — PROGRESS NOTES
BREAST FEEDING FOLLOW UP VISIT    Informant/Relationship: Ewa/mom    Discussion of General Lactation Issues: Nahed Brown has been putting [place] to the breast more often and has been collecting more when she pumps  Jorge Chinwell started Golacta powder 2x/day following the instructions on the bottle)  She is still giving her some formula when she gives her a bottle  The formula seems to bother Bristow's belly  She seems more uncomfortable and her bowel movements are different  They are more watery, green to yellow, and less frequent  Nursing is still painful, feeling like she is biting throughout the nursing, but getting her to latch has become easier  Nahed Brown also seems to be very gassy, passing gas all the time and straining  Infant is 4 weeks old today  Interval Breastfeeding History:    Frequency of breast feedin-5x/24 hour period  Does mother feel breastfeeding is effective: If no, explain: still seems hungry after nursing  Does infant appear satisfied after nursing:If no, explain: still seems hungry after  Stooling pattern normal:Yes  Urinating frequently:Yes  Using shield or shells:No    Alternative/Artificial Feedings:   Bottle: Yes, for supplement  Cup: N/A  Syringe/Finger: N/A           Formula Type: Enfamil Gentlease                     Amount: 2-3 oz            Breast Milk:                      Amount: 2-3 oz            Frequency Q 2-3 Hr between feedings  Elimination Problems: No      Equipment:  Nipple Shield             Type: n/a             Size: n/a             Frequency of Use: n/a  Pump            Type: Spectra            Frequency of Use: every 2-3 hours, about 1 hour after nursing/bottle feeding  Shells            Type: n/a            Frequency of use: n/a    Equipment Problems: no      Mom:  Breast: Normal  Nipple Assessment in General: Normal: elongated/eraser, no discoloration and no damage noted  Mother's Awareness of Feeding Cues                 Recognizes:  Yes                  Verbalizes: Yes  Support System: FOB  History of Breastfeeding: None  Changes/Stressors/Violence: Pain with nursing, need to supplement  Concerns/Goals: Afshan Stone would like to exclusively nurse Delfino Lieu  Problems with Mom: Pain with nursing     Physical Exam   Constitutional: She is oriented to person, place, and time  She appears well-developed and well-nourished  Neck: Normal range of motion  Neck supple  No thyromegaly present  Cardiovascular: Normal rate, regular rhythm, normal heart sounds and intact distal pulses  No murmur heard  Pulmonary/Chest: Effort normal and breath sounds normal    Lymphadenopathy:     She has no cervical adenopathy  Neurological: She is oriented to person, place, and time  Psychiatric: She has a normal mood and affect  Her behavior is normal  Judgment and thought content normal        Infant:  Behaviors: Alert  Color: Pink  Birth weight: 2 693kg  Current weight: 3 175kg    Problems with infant: Tongue tie, s/p frenotomy      General Appearance:  Alert, active, no distress                            Head:  Normocephalic, AFOF, sutures opposed                            Eyes:   Conjunctiva clear, no drainage                            Ears:   Normally placed, no anomolies                           Nose:   Septum intact, no drainage or erythema                          Mouth:  No lesions;residual frenulum felt and visualized with "speed bump" palpated; tongue with decreased lift and twisting to lateralize, extension does occur to over alveolar ridge                          Neck:  Supple, symmetrical, trachea midline, no adenopathy                Respiratory:  No grunting, flaring, retractions, breath sounds clear and equal           Cardiovascular:  Regular rate and rhythm  No murmur  Adequate perfusion/capillary refill   Femoral pulse present                  Abdomen:    Soft, non-tender, no masses, bowel sounds present, no HSM            Genitourinary:  Normal female genitalia, anus patent                         Spine:   No abnormalities noted       Musculoskeletal:   Full range of motion         Skin/Hair/Nails:   Skin warm, dry, and intact, no rashes or abnormal dyspigmentation or lesions               Neurologic:   No abnormal movement, tone appropriate for gestational age    [place] Latch:  Efficiency:               Lips Flanged: Yes, with assistance to gently compress the breast as if offering a sandwich and position the baby so that her lower lip and chin touch the breast with her nose just above the nipple  Depth of latch: Good, with assistance to gently compress the breast as if offering a sandwich and position the baby so that her lower lip and chin touch the breast with her nose just above the nipple  Audible Swallow: Yes, with assistance to gently compress the breast as if offering a sandwich and position the baby so that her lower lip and chin touch the breast with her nose just above the nipple  Visible Milk: Yes              Wide Open/ Asymmetrical: Yes, with assistance to gently compress the breast as if offering a sandwich and position the baby so that her lower lip and chin touch the breast with her nose just above the nipple  Suck Swallow Cycle: Breathing: unlabored, Coordinated: yes  Nipple Assessment after latch: Normal: elongated/eraser, no discoloration and no damage noted  Latch Problems: Residual tongue tie, but with much improvement     Position:  Infant's Ergonomics/Body               Body Alignment: Yes               Head Supported: Yes               Close to Mom's body/ Lifted/ Supported: Yes               Mom's Ergonomics/Body: Yes                           Supported:  Yes                           Sitting Back: Yes                           Brings Baby to her breast: Yes  Positioning Problems: None          Education:  Reviewed Latch: Reviewed how to gently compress the breast as if offering a sandwich to improve the depth of the latch  Reviewed Positioning for Dyad: Reviewed how to position the baby so that her lower lip and chin touch the breast with her nose just above the nipple to encourage a wider, more asymmetric latch  Reviewed Frequency/Supply & Demand: Recommended frequent nursing  Reviewed the normal frequency associated with breastfeeding as up to every 90 minutes   Reviewed Infant:Cues and varied States of Awareness  Reviewed Alternative/Artificial Feedings: Continue paced bottle feeding for any supplementation given  Plan:  Discussed history and physical exams with Boris Montemayor  Reassured her that her production is at least meeting most of [place]'s needs  Reviewed the difference between the feeding patterns of a  infant and a bottle fed infant  Reassurance given that feeding every 90 minutes is normal for a breastfeeding infant  Encouraged offering the breast as often as possible, especially before first offering the bottle  Support offered for any feeding choices that Boris Montemayor makes and the opportunity to return and further discuss a frenotomy revision of pain with nursing persists  I have spent 30 minutes with Patient  today in which greater than 50% of this time was spent in counseling/coordination of care regarding Prognosis, Intructions for management, Patient and family education and Impressions

## 2018-11-29 ENCOUNTER — POSTPARTUM VISIT (OUTPATIENT)
Dept: OBGYN CLINIC | Facility: CLINIC | Age: 18
End: 2018-11-29

## 2018-11-29 VITALS
SYSTOLIC BLOOD PRESSURE: 118 MMHG | BODY MASS INDEX: 27.6 KG/M2 | HEIGHT: 62 IN | WEIGHT: 150 LBS | DIASTOLIC BLOOD PRESSURE: 82 MMHG

## 2018-11-29 PROCEDURE — 99024 POSTOP FOLLOW-UP VISIT: CPT | Performed by: OBSTETRICS & GYNECOLOGY

## 2018-11-29 NOTE — PROGRESS NOTES
Assessment/Plan:    Normal postpartum check, released for normal activity  Barrier methods for contraception at this time, considering IUD and given information review  Return to office for annual exam when due  Return to office for contraceptive counseling if desired after 3 months       Problem List Items Addressed This Visit     Postpartum care following vaginal delivery - Primary    Vaginal delivery            Subjective:      Patient ID: Ta Vazquez is a 19 y o  female  Chantel Cos is here for her postpartum visit  She is doing well  She is both breast and bottle feeding  Bowels and bladder have returned to normal   Lochia is minimal   She has resume fairly normal activity and is resting when able  She plans to use barrier methods for contraception at this time  She had had a Nexplanon the past and had extreme mood swings  She is considering an IUD in the future and we did discuss the different types of IUD and she requested literature to review  We will discuss that further at her annual exam which is due in about 3 months  The        The following portions of the patient's history were reviewed and updated as appropriate: allergies, current medications, past family history, past medical history, past social history, past surgical history and problem list     Review of Systems   Constitutional: Negative for fatigue, fever and unexpected weight change  HENT: Negative for dental problem, mouth sores, nosebleeds, rhinorrhea, sinus pain, sinus pressure and sore throat  Eyes: Negative for pain, discharge and visual disturbance  Respiratory: Negative for cough, chest tightness, shortness of breath and wheezing  Cardiovascular: Negative for chest pain, palpitations and leg swelling  Gastrointestinal: Negative for blood in stool, constipation, diarrhea, nausea and vomiting  Endocrine: Negative for polydipsia     Genitourinary: Negative for difficulty urinating, dyspareunia, dysuria, menstrual problem, pelvic pain, urgency, vaginal discharge and vaginal pain  Musculoskeletal: Negative for arthralgias, back pain and joint swelling  Allergic/Immunologic: Negative for environmental allergies  Neurological: Negative for seizures, light-headedness and headaches  Hematological: Does not bruise/bleed easily  Psychiatric/Behavioral: Negative for sleep disturbance  The patient is not nervous/anxious  All other systems reviewed and are negative  Objective:      LMP  (LMP Unknown)          Physical Exam   Constitutional: She is oriented to person, place, and time  She appears well-developed and well-nourished  No distress  Gadiel Henson white female   HENT:   Head: Normocephalic and atraumatic  Abdominal: Soft  She exhibits no distension and no mass  There is no tenderness  There is no rebound and no guarding  Genitourinary:   Genitourinary Comments: Perineum intact  Cervix closed  Uterus well involuted, mobile and nontender   Neurological: She is alert and oriented to person, place, and time  Psychiatric: She has a normal mood and affect  Her behavior is normal  Judgment and thought content normal    Postpartum depression score equals 0   Vitals reviewed

## 2018-12-03 PROBLEM — O36.5930 POOR FETAL GROWTH AFFECTING MANAGEMENT OF MOTHER IN THIRD TRIMESTER: Status: RESOLVED | Noted: 2018-10-18 | Resolved: 2018-12-03

## 2018-12-03 PROBLEM — Z34.03 ENCOUNTER FOR SUPERVISION OF NORMAL FIRST PREGNANCY IN THIRD TRIMESTER: Status: RESOLVED | Noted: 2018-10-25 | Resolved: 2018-12-03

## 2018-12-03 PROBLEM — Z3A.37 37 WEEKS GESTATION OF PREGNANCY: Status: RESOLVED | Noted: 2018-10-18 | Resolved: 2018-12-03

## 2018-12-03 PROBLEM — Z3A.38 38 WEEKS GESTATION OF PREGNANCY: Status: RESOLVED | Noted: 2018-11-04 | Resolved: 2018-12-03

## 2018-12-03 NOTE — PROGRESS NOTES
I have reviewed the notes, assessments, and/or procedures performed by Oscar Banuelos, RN, IBCLC, I concur with her/his documentation of Charline Baker

## 2018-12-03 NOTE — PROGRESS NOTES
I have reviewed the notes, assessments, and/or procedures performed by Lobo Mcguire RN, IBCLC, I concur with her/his documentation of Ta Vazquez

## 2019-03-28 ENCOUNTER — ANNUAL EXAM (OUTPATIENT)
Dept: OBGYN CLINIC | Facility: CLINIC | Age: 19
End: 2019-03-28
Payer: COMMERCIAL

## 2019-03-28 VITALS
SYSTOLIC BLOOD PRESSURE: 112 MMHG | BODY MASS INDEX: 27.23 KG/M2 | HEIGHT: 62 IN | DIASTOLIC BLOOD PRESSURE: 80 MMHG | WEIGHT: 148 LBS

## 2019-03-28 DIAGNOSIS — N92.6 IRREGULAR MENSES: ICD-10-CM

## 2019-03-28 DIAGNOSIS — Z01.419 GYNECOLOGIC EXAM NORMAL: Primary | ICD-10-CM

## 2019-03-28 PROBLEM — F32.4 MAJOR DEPRESSION SINGLE EPISODE, IN PARTIAL REMISSION (HCC): Status: ACTIVE | Noted: 2019-03-21

## 2019-03-28 PROCEDURE — S0612 ANNUAL GYNECOLOGICAL EXAMINA: HCPCS | Performed by: PHYSICIAN ASSISTANT

## 2019-03-28 RX ORDER — SERTRALINE HYDROCHLORIDE 25 MG/1
25 TABLET, FILM COATED ORAL DAILY
Refills: 1 | COMMUNITY
Start: 2019-02-14 | End: 2019-03-28

## 2019-03-28 RX ORDER — ESCITALOPRAM OXALATE 10 MG/1
10 TABLET ORAL
COMMUNITY
Start: 2019-03-21 | End: 2020-01-17 | Stop reason: ALTCHOICE

## 2019-03-28 RX ORDER — LEVONORGESTREL AND ETHINYL ESTRADIOL 0.1-0.02MG
1 KIT ORAL DAILY
Qty: 28 TABLET | Refills: 2 | Status: SHIPPED | OUTPATIENT
Start: 2019-03-28 | End: 2019-06-25

## 2019-03-28 RX ORDER — HYDROXYZINE HYDROCHLORIDE 10 MG/1
TABLET, FILM COATED ORAL
Refills: 0 | COMMUNITY
Start: 2019-02-14 | End: 2019-03-28

## 2019-03-28 NOTE — PROGRESS NOTES
Assessment/Plan   Problem List Items Addressed This Visit        Other    Gynecologic exam normal - Primary     Pap guidelines reviewed  Will plan to start pap smears at age 22  Reviewed birth control options including OCP, NuvaRing, Patch, Depo, Nexplanon  Patient interested in IUD but unsure if she would like the Mirena IUD or Paragard  Concerned about hormonal side effects on Mirena but does not want worsening bleeding on Paragard  Reviewed trial of Lessina OCP to see if patient tolerates Levonorgestrel (the same progesterone in Mirena)  Patient would like to try  Reviewed when to start, what to do if misses pill  Recommend using condoms for the 1st month on the pill, if misses more than 2 pills in the pack, if on antibiotics and for STD prevention  Reviewed common side effects of the pill including nausea, vomiting, breast pain, bloating, fatigue, mood swings, weight gain, and increased acne  Reassured side effects typically diminish in the first month or two on the pill  Reviewed clotting risk and signs and symptoms of pulmonary embolism, DVT, myocardial infarction, stroke  Return to office in 3 months for pill check  Other Visit Diagnoses     Irregular menses        Relevant Medications    levonorgestrel-ethinyl estradiol (AVIANE,ALESSE,LESSINA) 0 1-20 MG-MCG per tablet          Subjective:     Patient ID: Miguelina Hanna is a 19 y o  y o  female  HPI  18 yo seen for annual exam  Patient is almost 6 months PP, doing well  Not currently breastfeeding  Mense every 25-35 days, not too heavy or painful  Patient currently has menses and refuses pelvic exam today  Would still like breast exam    Patient interested in starting birth control  Has been on Depo Provera and Nexplanon in the past and did not tolerate well  With Depo had constant bleeding  Nexplanon had terrible mood swings and spotting  Possibly interested in nonhormonal option     Last pap: N/A  The following portions of the patient's history were reviewed and updated as appropriate:   She  has a past medical history of IBS (irritable bowel syndrome), Pregnancy, and Varicella  She   Patient Active Problem List    Diagnosis Date Noted    Gynecologic exam normal 2019    Major depression single episode, in partial remission ([facility] [address] ) 2019    Postpartum care following vaginal delivery 2018    Vaginal delivery 2018    Fall 2018    Dislocation of patella 2017    Social anxiety disorder 2016    Generalized anxiety disorder 2015    Other constipation 10/08/2014    Migraine 2014     She  has a past surgical history that includes [place] tooth extraction and Induced   Her family history includes Cancer in her maternal grandfather and paternal grandfather; Diabetes in her maternal grandfather, paternal grandfather, and paternal grandmother; Heart disease in her maternal grandfather, paternal grandfather, and paternal grandmother; Hyperlipidemia in her maternal grandfather; Hypertension in her father and maternal grandfather; No Known Problems in her mother  She  reports that she has never smoked  She has never used smokeless tobacco  She reports that she does not drink alcohol or use drugs  Current Outpatient Medications   Medication Sig Dispense Refill    escitalopram (LEXAPRO) 10 mg tablet Take 10 mg by mouth      levonorgestrel-ethinyl estradiol (AVIANE,ALESSE,LESSINA) 0 1-20 MG-MCG per tablet Take 1 tablet by mouth daily 28 tablet 2     No current facility-administered medications for this visit  She has No Known Allergies       Menstrual History:  OB History        2    Para   1    Term   1            AB   1    Living   1       SAB        TAB   1    Ectopic        Multiple   0    Live Births   1                Menarche age: 13  Patient's last menstrual period was 2019    Period Cycle (Days): (25-35)  Period Duration (Days): 5  Period Pattern: Regular  Menstrual Flow: Light  Dysmenorrhea: None      Review of Systems   Constitutional: Negative for fatigue, fever and unexpected weight change  HENT: Negative for dental problem and sinus pressure  Eyes: Negative for visual disturbance  Respiratory: Negative for cough, shortness of breath and wheezing  Cardiovascular: Negative for chest pain  Gastrointestinal: Negative for abdominal pain, blood in stool, constipation, diarrhea, nausea and vomiting  Endocrine: Negative for polydipsia  Genitourinary: Negative for difficulty urinating, dyspareunia, dysuria, frequency, hematuria, pelvic pain and urgency  Musculoskeletal: Negative for arthralgias and back pain  Neurological: Negative for dizziness, seizures, light-headedness and headaches  Psychiatric/Behavioral: Negative for suicidal ideas  The patient is not nervous/anxious  Objective:  Vitals:    03/28/19 1007   BP: 112/80   BP Location: Left arm   Patient Position: Sitting   Cuff Size: Standard   Weight: 67 1 kg (148 lb)   Height: 5' 2" (1 575 m)      Physical Exam   Constitutional: She is oriented to person, place, and time  She appears well-developed and well-nourished  HENT:   Head: Normocephalic and atraumatic  Neck: No thyromegaly present  Cardiovascular: Normal rate, regular rhythm and normal heart sounds  Exam reveals no gallop and no friction rub  No murmur heard  Pulmonary/Chest: Effort normal and breath sounds normal  No respiratory distress  She has no wheezes  Right breast exhibits no inverted nipple, no mass, no nipple discharge, no skin change and no tenderness  Left breast exhibits no inverted nipple, no mass, no nipple discharge, no skin change and no tenderness  No breast swelling  Breasts are symmetrical    Abdominal: Soft  She exhibits no distension and no mass  There is no tenderness  There is no rebound and no guarding  No hernia  Lymphadenopathy:     She has no cervical adenopathy     Neurological: She is alert and oriented to person, place, and time  Skin: Skin is warm and dry  Psychiatric: She has a normal mood and affect   Her behavior is normal

## 2019-03-28 NOTE — ASSESSMENT & PLAN NOTE
Pap guidelines reviewed  Will plan to start pap smears at age [address]  Reviewed birth control options including OCP, NuvaRing, Patch, Depo, Nexplanon  Patient interested in IUD but unsure if she would like the Mirena IUD or Paragard  Concerned about hormonal side effects on Mirena but does not want worsening bleeding on Paragard  Reviewed trial of Lessina OCP to see if patient tolerates Levonorgestrel (the same progesterone in Mirena)  Patient would like to try  Reviewed when to start, what to do if misses pill  Recommend using condoms for the 1st month on the pill, if misses more than 2 pills in the pack, if on antibiotics and for STD prevention  Reviewed common side effects of the pill including nausea, vomiting, breast pain, bloating, fatigue, mood swings, weight gain, and increased acne  Reassured side effects typically diminish in the first month or two on the pill  Reviewed clotting risk and signs and symptoms of pulmonary embolism, DVT, myocardial infarction, stroke  Return to office in 3 months for pill check

## 2019-06-25 ENCOUNTER — OFFICE VISIT (OUTPATIENT)
Dept: OBGYN CLINIC | Facility: CLINIC | Age: 19
End: 2019-06-25
Payer: COMMERCIAL

## 2019-06-25 VITALS
BODY MASS INDEX: 26.87 KG/M2 | SYSTOLIC BLOOD PRESSURE: 108 MMHG | HEIGHT: 62 IN | DIASTOLIC BLOOD PRESSURE: 62 MMHG | WEIGHT: 146 LBS

## 2019-06-25 DIAGNOSIS — Z30.09 COUNSELING FOR BIRTH CONTROL, INTRAUTERINE DEVICE: Primary | ICD-10-CM

## 2019-06-25 PROCEDURE — 99213 OFFICE O/P EST LOW 20 MIN: CPT | Performed by: PHYSICIAN ASSISTANT

## 2019-07-09 ENCOUNTER — TELEPHONE (OUTPATIENT)
Dept: OBGYN CLINIC | Facility: CLINIC | Age: 19
End: 2019-07-09

## 2019-07-09 NOTE — TELEPHONE ENCOUNTER
Called [facility], spoke with Jodee Bonds, reference number for call is [phone]  Codes  for NEHCQA,14238 for insertion, and diagnosis code [id number] given for Paraguard  As per rep pt is covered at 100%  No co-pay, no co-insurance, or out of pocket expense

## 2019-07-10 NOTE — TELEPHONE ENCOUNTER
Called patient to review benefits on the Paragard  No able to leave a vm on mobile number [phone]  Called second number listed ([phone]) and lmom for patient to call the office back  No details were giving on this vm

## 2019-07-24 ENCOUNTER — TELEPHONE (OUTPATIENT)
Dept: OBGYN CLINIC | Facility: CLINIC | Age: 19
End: 2019-07-24

## 2019-07-30 ENCOUNTER — PROCEDURE VISIT (OUTPATIENT)
Dept: OBGYN CLINIC | Facility: CLINIC | Age: 19
End: 2019-07-30
Payer: COMMERCIAL

## 2019-07-30 VITALS
HEIGHT: 62 IN | SYSTOLIC BLOOD PRESSURE: 104 MMHG | DIASTOLIC BLOOD PRESSURE: 68 MMHG | BODY MASS INDEX: 26.54 KG/M2 | WEIGHT: 144.2 LBS

## 2019-07-30 DIAGNOSIS — Z30.430 ENCOUNTER FOR IUD INSERTION: Primary | ICD-10-CM

## 2019-07-30 PROCEDURE — 58300 INSERT INTRAUTERINE DEVICE: CPT | Performed by: PHYSICIAN ASSISTANT

## 2019-07-30 PROCEDURE — 81025 URINE PREGNANCY TEST: CPT | Performed by: PHYSICIAN ASSISTANT

## 2019-07-30 RX ORDER — COPPER 313.4 MG/1
1 INTRAUTERINE DEVICE INTRAUTERINE ONCE
Status: COMPLETED | OUTPATIENT
Start: 2019-07-30 | End: 2019-07-30

## 2019-07-30 RX ADMIN — COPPER 1 INTRA UTERINE DEVICE: 313.4 INTRAUTERINE DEVICE INTRAUTERINE at 14:43

## 2019-07-30 NOTE — PROGRESS NOTES
Assessment/Plan:    Encounter for IUD insertion  No intercourse, tampon use, soaking in bath tub, or swimming for the next 2 days to avoid risk of infection  Call office with excessive bleeding, cramping, fever, or chills  Problem List Items Addressed This Visit        Other    Encounter for IUD insertion - Primary     No intercourse, tampon use, soaking in bath tub, or swimming for the next 2 days to avoid risk of infection  Call office with excessive bleeding, cramping, fever, or chills  Relevant Medications    intrauterine copper (PARAGARD) IUD (Completed)            Subjective:      Patient ID: Parish Aldana is a 19 y o  female  HPI  18 yo seen for Paragard IUD insertion  LMP: 2019  Negative pregnancy test today  The following portions of the patient's history were reviewed and updated as appropriate:   She  has a past medical history of IBS (irritable bowel syndrome), Pregnancy, and Varicella  She   Patient Active Problem List    Diagnosis Date Noted    Encounter for IUD insertion 2019    Counseling for birth control, intrauterine device 2019    Gynecologic exam normal 2019    [facility]    Postpartum care following vaginal delivery 2018    Vaginal delivery 2018    Fall 2018    Dislocation of patella 2017    Social anxiety disorder 2016    Generalized anxiety disorder 2015    Other constipation 10/08/2014    Migraine 2014     She  has a past surgical history that includes [place] tooth extraction and Induced     Her family history includes Cancer in her maternal grandfather and paternal grandfather; Diabetes in her maternal grandfather, paternal grandfather, and paternal grandmother; Heart disease in her maternal grandfather, paternal grandfather, and paternal grandmother; Hyperlipidemia in her maternal grandfather; Hypertension in her father and maternal grandfather; No Known Problems in her mother  She  reports that she has never smoked  She has never used smokeless tobacco  She reports that she does not drink alcohol or use drugs  Current Outpatient Medications   Medication Sig Dispense Refill    escitalopram (LEXAPRO) 10 mg tablet Take 10 mg by mouth       No current facility-administered medications for this visit  She has No Known Allergies       Review of Systems   Constitutional: Negative for fatigue, fever and unexpected weight change  HENT: Negative for dental problem and sinus pressure  Eyes: Negative for visual disturbance  Respiratory: Negative for cough, shortness of breath and wheezing  Cardiovascular: Negative for chest pain  Gastrointestinal: Negative for abdominal pain, blood in stool, constipation, diarrhea, nausea and vomiting  Endocrine: Negative for polydipsia  Genitourinary: Negative for difficulty urinating, dyspareunia, dysuria, frequency, hematuria, pelvic pain and urgency  Musculoskeletal: Negative for arthralgias and back pain  Neurological: Negative for dizziness, seizures, light-headedness and headaches  Psychiatric/Behavioral: Negative for suicidal ideas  The patient is not nervous/anxious  Objective:      /68 (BP Location: Left arm, Patient Position: Sitting, Cuff Size: Standard)   Ht 5' 2" (1 575 m)   Wt 65 4 kg (144 lb 3 2 oz)   LMP 07/25/2019   BMI 26 37 kg/m²          Physical Exam   Constitutional: She is oriented to person, place, and time  She appears well-developed and well-nourished  Genitourinary: There is no rash, tenderness, lesion or injury on the right labia  There is no rash, tenderness, lesion or injury on the left labia  Cervix exhibits no motion tenderness, no discharge and no friability  No erythema, tenderness or bleeding in the vagina  No foreign body in the vagina  No signs of injury around the vagina  No vaginal discharge found     Neurological: She is alert and oriented to person, place, and time  Skin: Skin is warm and dry  No rash noted  No erythema  No pallor         Iud insertions  Date/Time: 7/30/2019 2:46 PM  Performed by: Tyrone Anglin PA-C  Authorized by: Tyrone Anglin PA-C     Consent:     Consent obtained:  Verbal and written    Consent given by:  Patient    Procedure risks and benefits discussed: yes      Patient questions answered: yes      Patient agrees, verbalizes understanding, and wants to proceed: yes      Educational handouts given: yes      Instructions and paperwork completed: yes    Universal protocol:     Patient states understanding of procedure being performed: yes      Relevant documents present and verified: yes    Procedure:     Pelvic exam performed: yes      Negative urine pregnancy test: yes      Cervix cleaned and prepped: yes      Speculum placed in vagina: yes      Tenaculum applied to cervix: yes      Uterus sounded: yes (retroverted)      Uterus sound depth (cm):  7    IUD inserted with no complications: yes      IUD type:  ParaGard    Strings trimmed: yes (2-3 cm)    Post-procedure:     Patient tolerated procedure well: yes      Patient will follow up after next period: yes

## 2019-07-30 NOTE — PATIENT INSTRUCTIONS
No intercourse, tampon use, soaking in bath tub, or swimming for the next 2 days to avoid risk of infection  Call office with excessive bleeding, cramping, fever, or chills

## 2019-12-05 DIAGNOSIS — B37.3 VAGINAL YEAST INFECTION: Primary | ICD-10-CM

## 2019-12-05 RX ORDER — FLUCONAZOLE 150 MG/1
150 TABLET ORAL EVERY OTHER DAY
Qty: 2 TABLET | Refills: 0 | Status: SHIPPED | OUTPATIENT
Start: 2019-12-05 | End: 2019-12-08

## 2019-12-12 ENCOUNTER — OFFICE VISIT (OUTPATIENT)
Dept: OBGYN CLINIC | Facility: CLINIC | Age: 19
End: 2019-12-12
Payer: COMMERCIAL

## 2019-12-12 VITALS
DIASTOLIC BLOOD PRESSURE: 78 MMHG | BODY MASS INDEX: 26.68 KG/M2 | HEIGHT: 62 IN | WEIGHT: 145 LBS | SYSTOLIC BLOOD PRESSURE: 118 MMHG

## 2019-12-12 DIAGNOSIS — B96.89 BACTERIAL VAGINOSIS: Primary | ICD-10-CM

## 2019-12-12 DIAGNOSIS — N76.0 BACTERIAL VAGINOSIS: Primary | ICD-10-CM

## 2019-12-12 DIAGNOSIS — Z11.3 SCREENING EXAMINATION FOR VENEREAL DISEASE: ICD-10-CM

## 2019-12-12 DIAGNOSIS — N89.8 VAGINAL DISCHARGE: ICD-10-CM

## 2019-12-12 PROCEDURE — 87591 N.GONORRHOEAE DNA AMP PROB: CPT | Performed by: PHYSICIAN ASSISTANT

## 2019-12-12 PROCEDURE — 99214 OFFICE O/P EST MOD 30 MIN: CPT | Performed by: PHYSICIAN ASSISTANT

## 2019-12-12 PROCEDURE — 87210 SMEAR WET MOUNT SALINE/INK: CPT | Performed by: PHYSICIAN ASSISTANT

## 2019-12-12 PROCEDURE — 87147 CULTURE TYPE IMMUNOLOGIC: CPT | Performed by: PHYSICIAN ASSISTANT

## 2019-12-12 PROCEDURE — 87491 CHLMYD TRACH DNA AMP PROBE: CPT | Performed by: PHYSICIAN ASSISTANT

## 2019-12-12 PROCEDURE — 87070 CULTURE OTHR SPECIMN AEROBIC: CPT | Performed by: PHYSICIAN ASSISTANT

## 2019-12-12 RX ORDER — METRONIDAZOLE 500 MG/1
500 TABLET ORAL EVERY 12 HOURS SCHEDULED
Qty: 14 TABLET | Refills: 0 | Status: SHIPPED | OUTPATIENT
Start: 2019-12-12 | End: 2019-12-19

## 2019-12-12 NOTE — PROGRESS NOTES
Assessment/Plan:    Bacterial vaginosis  Reviewed BV with patient  Will plan to treat with Metronidazole 500mg BID x 7 days  For prevention: Recommend acidophilus or yogurt daily, avoid irritating soaps or lotions, no tight fitted pants or underwear, avoid bubble baths, do not stay in wet swimsuit  STD cultures and genital culture done today as well  Office will call with results and appropriate follow up  Problem List Items Addressed This Visit        Genitourinary    Bacterial vaginosis - Primary     Reviewed BV with patient  Will plan to treat with Metronidazole 500mg BID x 7 days  For prevention: Recommend acidophilus or yogurt daily, avoid irritating soaps or lotions, no tight fitted pants or underwear, avoid bubble baths, do not stay in wet swimsuit  STD cultures and genital culture done today as well  Office will call with results and appropriate follow up  Relevant Medications    metroNIDAZOLE (FLAGYL) 500 mg tablet      Other Visit Diagnoses     Vaginal discharge        Relevant Orders    Genital Comprehensive Culture    Screening examination for venereal disease        Relevant Orders    Chlamydia/GC amplified DNA by PCR            Subjective:      Patient ID: Lorraine Fraser is a 22 y o  female  HPI  23 yo seen for IUD check and vaginal discharge  Reports last week had symptoms of yeast infection, itching and irritation  Treated with Diflucan, itching resolved, but then started with this thin watery yellow discharge  Denies itching, irritation, bowel or bladder issues  Does admit to pelvic menstrual like cramping  Not relieved by tylenol for the past few days, comes and goes  Patient has Paragard IUD inserted 7/30/2019  Tried to feel for strings and couldn't feel them  Patient does admit to a new partner     The following portions of the patient's history were reviewed and updated as appropriate:   She  has a past medical history of IBS (irritable bowel syndrome), Pregnancy, and Varicella  She   Patient Active Problem List    Diagnosis Date Noted    Bacterial vaginosis 2019    Encounter for IUD insertion 2019    Counseling for birth control, intrauterine device 2019    Gynecologic exam normal 2019    Major depression single episode, in partial remission ([facility] [address] ) 2019    Postpartum care following vaginal delivery 2018    Vaginal delivery 2018    Fall 2018    Dislocation of patella 2017    Social anxiety disorder 2016    Generalized anxiety disorder 2015    Other constipation 10/08/2014    Migraine 2014     She  has a past surgical history that includes [place] tooth extraction and Induced   Her family history includes Cancer in her maternal grandfather and paternal grandfather; Diabetes in her maternal grandfather, paternal grandfather, and paternal grandmother; Heart disease in her maternal grandfather, paternal grandfather, and paternal grandmother; Hyperlipidemia in her maternal grandfather; Hypertension in her father and maternal grandfather; No Known Problems in her mother  She  reports that she has never smoked  She has never used smokeless tobacco  She reports that she does not drink alcohol or use drugs  Current Outpatient Medications   Medication Sig Dispense Refill    escitalopram (LEXAPRO) 10 mg tablet Take 10 mg by mouth      metroNIDAZOLE (FLAGYL) 500 mg tablet Take 1 tablet (500 mg total) by mouth every 12 (twelve) hours for 7 days 14 tablet 0     No current facility-administered medications for this visit  She has No Known Allergies       Review of Systems   Constitutional: Negative for fatigue, fever and unexpected weight change  HENT: Negative for dental problem and sinus pressure  Eyes: Negative for visual disturbance  Respiratory: Negative for cough, shortness of breath and wheezing  Cardiovascular: Negative for chest pain     Gastrointestinal: Negative for abdominal pain, blood in stool, constipation, diarrhea, nausea and vomiting  Endocrine: Negative for polydipsia  Genitourinary: Positive for vaginal discharge  Negative for difficulty urinating, dyspareunia, dysuria, frequency, hematuria, pelvic pain and urgency  Musculoskeletal: Negative for arthralgias and back pain  Neurological: Negative for dizziness, seizures, light-headedness and headaches  Psychiatric/Behavioral: Negative for suicidal ideas  The patient is not nervous/anxious  Objective:      /78 (BP Location: Left arm, Patient Position: Sitting, Cuff Size: Standard)   Ht 5' 2" (1 575 m)   Wt 65 8 kg (145 lb)   LMP 11/23/2019 (Exact Date)   BMI 26 52 kg/m²          Physical Exam   Constitutional: She is oriented to person, place, and time  She appears well-developed and well-nourished  Neck: Neck supple  No thyroid mass and no thyromegaly present  Cardiovascular: Normal rate, regular rhythm and normal heart sounds  Exam reveals no gallop and no friction rub  No murmur heard  Pulmonary/Chest: Effort normal and breath sounds normal  No respiratory distress  She has no wheezes  She has no rales  Abdominal: Soft  Normal appearance and bowel sounds are normal  She exhibits no distension and no mass  There is no tenderness  There is no rebound and no guarding  Genitourinary: There is no rash, tenderness, lesion or injury on the right labia  There is no rash, tenderness, lesion or injury on the left labia  Uterus is not deviated, not enlarged and not tender  Cervix exhibits no motion tenderness, no discharge and no friability  Right adnexum displays no mass, no tenderness and no fullness  Left adnexum displays no mass, no tenderness and no fullness  No erythema, tenderness or bleeding in the vagina  No signs of injury around the vagina  Vaginal discharge (thin yellow discharge in vaginal vault) found  Lymphadenopathy:     She has no cervical adenopathy          Right: No inguinal and no supraclavicular adenopathy present  Left: No inguinal and no supraclavicular adenopathy present  Neurological: She is alert and oriented to person, place, and time  Skin: Skin is warm and dry  No rash noted  No erythema  No pallor  Psychiatric: She has a normal mood and affect   Her behavior is normal  Judgment and thought content normal        Wet mount: clue cells present, no trichomonads or yeast

## 2019-12-13 LAB
C TRACH DNA SPEC QL NAA+PROBE: NEGATIVE
N GONORRHOEA DNA SPEC QL NAA+PROBE: NEGATIVE

## 2019-12-13 NOTE — ASSESSMENT & PLAN NOTE
Reviewed BV with patient  Will plan to treat with Metronidazole 500mg BID x 7 days  For prevention: Recommend acidophilus or yogurt daily, avoid irritating soaps or lotions, no tight fitted pants or underwear, avoid bubble baths, do not stay in wet swimsuit  STD cultures and genital culture done today as well  Office will call with results and appropriate follow up

## 2019-12-14 LAB
BACTERIA GENITAL AEROBE CULT: ABNORMAL
BACTERIA GENITAL AEROBE CULT: ABNORMAL

## 2020-01-16 ENCOUNTER — TELEPHONE (OUTPATIENT)
Dept: OBGYN CLINIC | Facility: CLINIC | Age: 20
End: 2020-01-16

## 2020-01-17 ENCOUNTER — OFFICE VISIT (OUTPATIENT)
Dept: OBGYN CLINIC | Facility: CLINIC | Age: 20
End: 2020-01-17
Payer: COMMERCIAL

## 2020-01-17 VITALS
WEIGHT: 145 LBS | DIASTOLIC BLOOD PRESSURE: 80 MMHG | SYSTOLIC BLOOD PRESSURE: 120 MMHG | BODY MASS INDEX: 26.68 KG/M2 | HEIGHT: 62 IN

## 2020-01-17 DIAGNOSIS — N76.0 ACUTE VAGINITIS: Primary | ICD-10-CM

## 2020-01-17 PROCEDURE — 99214 OFFICE O/P EST MOD 30 MIN: CPT | Performed by: NURSE PRACTITIONER

## 2020-01-17 RX ORDER — FLUCONAZOLE 150 MG/1
150 TABLET ORAL EVERY OTHER DAY
Qty: 2 TABLET | Refills: 0 | Status: SHIPPED | OUTPATIENT
Start: 2020-01-17 | End: 2020-01-20

## 2020-01-17 NOTE — PROGRESS NOTES
Assessment/Plan:    Acute vaginitis  History and physical consistent with yeast infection  Treatment options reviewed  Rx for Diflucan sent to pharmacy  Reviewed loose cotton clothing  Loose cotton underwear, avoid thongs  Change promptly out of wet bathing suits or sweaty clothing  No scented products vaginally  Acidophilous probiotic daily  Pt to call if symptoms worsen or fail to improve after treatment        Diagnoses and all orders for this visit:    Acute vaginitis  -     fluconazole (DIFLUCAN) 150 mg tablet; Take 1 tablet (150 mg total) by mouth every other day for 2 doses          Subjective:      Patient ID: Alvaro Johnson is a 22 y o  female  Pt presents with complaints of recurrent Bacterial Vaginosis  She was recently diagnosed and treated with Flagyl BID x 7 days pm 12/12/19  Symptoms resolved and then started noticing symptoms returning  Current symptoms began about 3 days  (+) burning  (+) itching  (-) discharge  (-) odor  (-) lumps, bumps, rashes  (-) abnormal vaginal bleeding    Denies any new products vaginally  Denies any new sexual partners, denies the need for STD testing  Denies any bowel or bladder complaints  Denies any treatment since symptoms started  LMP: 1/6/2020      The following portions of the patient's history were reviewed and updated as appropriate: allergies, current medications, past family history, past medical history, past social history, past surgical history and problem list     Review of Systems   Genitourinary: Positive for vaginal pain  All other systems reviewed and are negative  Objective:      /80   Ht 5' 2" (1 575 m)   Wt 65 8 kg (145 lb)   LMP 01/05/2020   Breastfeeding? No   BMI 26 52 kg/m²          Physical Exam   Constitutional: She is oriented to person, place, and time  She appears well-developed and well-nourished  HENT:   Head: Normocephalic and atraumatic     Cardiovascular: Normal rate, regular rhythm and normal heart sounds  Pulmonary/Chest: Effort normal and breath sounds normal    Abdominal: Soft  Bowel sounds are normal  She exhibits no distension and no mass  There is no tenderness  There is no rebound and no guarding  Genitourinary: Uterus normal  No labial fusion  There is no rash (erythemia), tenderness, lesion or injury on the right labia  There is no rash (erythemia), tenderness, lesion or injury on the left labia  Cervix exhibits no motion tenderness, no discharge and no friability  Right adnexum displays no mass, no tenderness and no fullness  Left adnexum displays no mass, no tenderness and no fullness  There is erythema in the vagina  No tenderness or bleeding in the vagina  No foreign body in the vagina  No signs of injury around the vagina  Vaginal discharge (thick white, cottage cheese like discharge) found  Genitourinary Comments: IUD strings visualized   Musculoskeletal: Normal range of motion  Neurological: She is alert and oriented to person, place, and time  Skin: Skin is warm and dry  Psychiatric: She has a normal mood and affect   Her behavior is normal  Judgment and thought content normal

## 2020-01-17 NOTE — ASSESSMENT & PLAN NOTE
History and physical consistent with yeast infection  Treatment options reviewed  Rx for Diflucan sent to pharmacy  Reviewed loose cotton clothing  Loose cotton underwear, avoid thongs  Change promptly out of wet bathing suits or sweaty clothing  No scented products vaginally  Acidophilous probiotic daily     Pt to call if symptoms worsen or fail to improve after treatment

## 2020-04-24 ENCOUNTER — TELEPHONE (OUTPATIENT)
Dept: OBGYN CLINIC | Facility: CLINIC | Age: 20
End: 2020-04-24

## 2020-04-27 ENCOUNTER — TELEMEDICINE (OUTPATIENT)
Dept: OBGYN CLINIC | Facility: CLINIC | Age: 20
End: 2020-04-27
Payer: COMMERCIAL

## 2020-04-27 DIAGNOSIS — N76.0 VULVOVAGINITIS: Primary | ICD-10-CM

## 2020-04-27 PROBLEM — Z30.09 COUNSELING FOR BIRTH CONTROL, INTRAUTERINE DEVICE: Status: RESOLVED | Noted: 2019-06-25 | Resolved: 2020-04-27

## 2020-04-27 PROBLEM — Z30.430 ENCOUNTER FOR IUD INSERTION: Status: RESOLVED | Noted: 2019-07-30 | Resolved: 2020-04-27

## 2020-04-27 PROBLEM — B96.89 BACTERIAL VAGINOSIS: Status: RESOLVED | Noted: 2019-12-12 | Resolved: 2020-04-27

## 2020-04-27 PROCEDURE — 99213 OFFICE O/P EST LOW 20 MIN: CPT | Performed by: PHYSICIAN ASSISTANT

## 2020-05-13 ENCOUNTER — TELEPHONE (OUTPATIENT)
Dept: OBGYN CLINIC | Facility: CLINIC | Age: 20
End: 2020-05-13

## 2020-05-18 ENCOUNTER — TELEPHONE (OUTPATIENT)
Dept: OBGYN CLINIC | Facility: CLINIC | Age: 20
End: 2020-05-18

## 2020-05-19 ENCOUNTER — OFFICE VISIT (OUTPATIENT)
Dept: OBGYN CLINIC | Facility: CLINIC | Age: 20
End: 2020-05-19

## 2020-05-19 VITALS
DIASTOLIC BLOOD PRESSURE: 76 MMHG | WEIGHT: 139 LBS | TEMPERATURE: 98.2 F | HEIGHT: 62 IN | SYSTOLIC BLOOD PRESSURE: 110 MMHG | BODY MASS INDEX: 25.58 KG/M2

## 2020-05-19 DIAGNOSIS — T83.9XXA COMPLICATION OF INTRAUTERINE DEVICE (IUD), UNSPECIFIED COMPLICATION, INITIAL ENCOUNTER (HCC): Primary | ICD-10-CM

## 2020-05-19 PROCEDURE — 99213 OFFICE O/P EST LOW 20 MIN: CPT | Performed by: PHYSICIAN ASSISTANT

## 2020-08-26 ENCOUNTER — OFFICE VISIT (OUTPATIENT)
Dept: GASTROENTEROLOGY | Facility: CLINIC | Age: 20
End: 2020-08-26
Payer: COMMERCIAL

## 2020-08-26 VITALS
SYSTOLIC BLOOD PRESSURE: 116 MMHG | BODY MASS INDEX: 23.19 KG/M2 | HEART RATE: 73 BPM | DIASTOLIC BLOOD PRESSURE: 79 MMHG | TEMPERATURE: 98.2 F | WEIGHT: 126 LBS | HEIGHT: 62 IN

## 2020-08-26 DIAGNOSIS — R10.9 ABDOMINAL CRAMPING: ICD-10-CM

## 2020-08-26 DIAGNOSIS — K59.00 CONSTIPATION, UNSPECIFIED CONSTIPATION TYPE: Primary | ICD-10-CM

## 2020-08-26 PROCEDURE — 99204 OFFICE O/P NEW MOD 45 MIN: CPT | Performed by: INTERNAL MEDICINE

## 2020-08-26 RX ORDER — DOCUSATE SODIUM 100 MG/1
100 CAPSULE, LIQUID FILLED ORAL 2 TIMES DAILY
Qty: 10 CAPSULE | Refills: 0 | Status: SHIPPED | OUTPATIENT
Start: 2020-08-26 | End: 2020-11-22 | Stop reason: SDUPTHER

## 2020-08-26 RX ORDER — GABAPENTIN 100 MG/1
100 CAPSULE ORAL 2 TIMES DAILY
COMMUNITY
Start: 2020-08-06

## 2020-08-26 RX ORDER — DICYCLOMINE HCL 20 MG
20 TABLET ORAL EVERY 6 HOURS PRN
Qty: 120 TABLET | Refills: 3 | Status: SHIPPED | OUTPATIENT
Start: 2020-08-26

## 2020-08-26 NOTE — PROGRESS NOTES
[facility] - Outpatient Note  Elvis Polanco 22 y o  female MRN: [id number]  Encounter: [id number]      ASSESSMENT AND PLAN:    Elvis Polanco is a 22 y o  old female with PMH of depression, anxiety, IBS-C who presents for consultation for altered bowel movements of both constipation and diarrhea with associated weight loss    Altered bowel movements with weight loss - patient has a longstanding history of constipation since 13years old however she now does have altered bowel movement since January which could be IBS mixed verses overflow diarrhea  However it is concerned that she has lost we of 11 lb in 3 months unintentionally  And she also is complaining of early satiety and nausea that is new since January  Will need to rule out celiac disease, H pylori  May have dyspepsia as well versus gastritis  · EGD/colonoscopy ordered to assess for weight loss and to rule out celiac and H pylori among other causes  · Start docusate 100mg p r n  for constipation and start Bentyl prn for abdominal cramping  · Can consider starting PPI therapy after EGD      1  Constipation, unspecified constipation type  - docusate sodium (COLACE) 100 mg capsule; Take 1 capsule (100 mg total) by mouth 2 (two) times a day  Dispense: 10 capsule; Refill: 0  - Colonoscopy; Future  - EGD; Future    ______________________________________________________________________    SUBJECTIVE:  Patient is a 49-year-old female with history of depression, anxiety, IBS constipation who presents with altered bowel movements  Patient has been constipated since age 13 however since January she has been alternating with constipation/diarrhea with predominant diarrhea (75%) which is new for her  She also has lost 11 lb the past 8 months  She does not drink very much fluid only 2-3 bowels water per day  She has a low-fiber diet    She also describes early satiety, nausea, vomiting occurs intermittently, nonbloody/nonbilious  REVIEW OF SYSTEMS IS OTHERWISE NEGATIVE  Historical Information   Past Medical History:   Diagnosis Date    IBS (irritable bowel syndrome)     Pregnancy     Varicella     as child     Past Surgical History:   Procedure Laterality Date    INDUCED       WISDOM TOOTH EXTRACTION       Social History   Social History     Substance and Sexual Activity   Alcohol Use Yes     Social History     Substance and Sexual Activity   Drug Use No     Social History     Tobacco Use   Smoking Status Never Smoker   Smokeless Tobacco Never Used     Family History   Problem Relation Age of Onset    Hypertension Father     No Known Problems Mother     Heart disease Maternal Grandfather         MI    Diabetes Maternal Grandfather         type 2    Hyperlipidemia Maternal Grandfather     Hypertension Maternal Grandfather     Cancer Maternal Grandfather         skin    Diabetes Paternal Grandfather         type 2    Cancer Paternal Grandfather         lymphoma and skin    Heart disease Paternal Grandfather     Diabetes Paternal Grandmother         type 2    Heart disease Paternal Grandmother        Meds/Allergies       Current Outpatient Medications:     gabapentin (NEURONTIN) 100 mg capsule    dicyclomine (BENTYL) 20 mg tablet    docusate sodium (COLACE) 100 mg capsule    No Known Allergies        Objective     Blood pressure 116/79, pulse 73, temperature 98 2 °F (36 8 °C), temperature source Tympanic, height 5' 2" (1 575 m), weight 57 2 kg (126 lb), not currently breastfeeding  Body mass index is 23 05 kg/m²  PHYSICAL EXAM:      General Appearance:   Alert, cooperative, no distress   HEENT:   Normocephalic, atraumatic, anicteric  Neck:  Supple, symmetrical, trachea midline   Lungs:   Clear to auscultation bilaterally; no rales, rhonchi or wheezing; respirations unlabored    Heart[de-identified]   Regular rate and rhythm; no murmur, rub, or gallop     Abdomen:   Soft, non-tender, non-distended; normal bowel sounds; no masses, no organomegaly    Genitalia:   Deferred    Rectal:   Deferred    Extremities:  No cyanosis, clubbing or edema    Pulses:  2+ and symmetric    Skin:  No jaundice, rashes, or lesions    Lymph nodes:  No palpable cervical lymphadenopathy        Lab Results:   No visits with results within 1 Day(s) from this visit  Latest known visit with results is:   Office Visit on 12/12/2019   Component Date Value    N gonorrhoeae, DNA Probe 12/12/2019 Negative     Chlamydia trachomatis, D* 12/12/2019 Negative     Genital Culture 12/12/2019 Few Colonies of Beta Hemolytic Streptococcus Group B*    Genital Culture 12/12/2019 1+ Growth of           Radiology Results:   No results found      Answers for HPI/ROS submitted by the patient on 8/19/2020   Abdominal pain  Chronicity: chronic  Onset: more than 1 year ago  Onset quality: undetermined  Frequency: daily  Episode duration: 3 hours  Progression since onset: rapidly worsening  Pain location: suprapubic region  Pain - numeric: 6/10  Pain quality: sharp  Radiates to: does not radiate  anorexia: Yes  arthralgias: No  belching: Yes  constipation: Yes  diarrhea: Yes  dysuria: No  fever: No  flatus: No  frequency: No  headaches: No  hematochezia: No  hematuria: No  melena: No  myalgias: No  nausea: Yes  weight loss: Yes  vomiting: Yes  Aggravated by: eating  Relieved by: nothing

## 2020-10-07 ENCOUNTER — TELEPHONE (OUTPATIENT)
Dept: GASTROENTEROLOGY | Facility: CLINIC | Age: 20
End: 2020-10-07

## 2020-11-03 ENCOUNTER — TELEPHONE (OUTPATIENT)
Dept: OBGYN CLINIC | Facility: CLINIC | Age: 20
End: 2020-11-03

## 2020-11-22 DIAGNOSIS — K59.00 CONSTIPATION, UNSPECIFIED CONSTIPATION TYPE: ICD-10-CM

## 2020-11-22 RX ORDER — DOCUSATE SODIUM 100 MG/1
100 CAPSULE, LIQUID FILLED ORAL 2 TIMES DAILY
Qty: 60 CAPSULE | Refills: 3 | Status: SHIPPED | OUTPATIENT
Start: 2020-11-22

## 2020-11-25 ENCOUNTER — APPOINTMENT (EMERGENCY)
Dept: RADIOLOGY | Facility: HOSPITAL | Age: 20
End: 2020-11-25
Payer: COMMERCIAL

## 2020-11-25 ENCOUNTER — HOSPITAL ENCOUNTER (EMERGENCY)
Facility: HOSPITAL | Age: 20
Discharge: HOME | End: 2020-11-25
Attending: EMERGENCY MEDICINE
Payer: COMMERCIAL

## 2020-11-25 VITALS
HEART RATE: 105 BPM | RESPIRATION RATE: 18 BRPM | OXYGEN SATURATION: 100 % | TEMPERATURE: 100.9 F | DIASTOLIC BLOOD PRESSURE: 73 MMHG | SYSTOLIC BLOOD PRESSURE: 113 MMHG

## 2020-11-25 DIAGNOSIS — R50.9 FEVER: Primary | ICD-10-CM

## 2020-11-25 DIAGNOSIS — J06.9 URI (UPPER RESPIRATORY INFECTION): ICD-10-CM

## 2020-11-25 DIAGNOSIS — J02.9 PHARYNGITIS: ICD-10-CM

## 2020-11-25 LAB
FLUAV RNA RESP QL NAA+PROBE: NEGATIVE
FLUBV RNA RESP QL NAA+PROBE: NEGATIVE
RSV RNA RESP QL NAA+PROBE: NEGATIVE
S PYO DNA THROAT QL NAA+PROBE: NORMAL
SARS-COV-2 RNA RESP QL NAA+PROBE: POSITIVE

## 2020-11-25 PROCEDURE — 99284 EMERGENCY DEPT VISIT MOD MDM: CPT

## 2020-11-25 PROCEDURE — 71045 X-RAY EXAM CHEST 1 VIEW: CPT

## 2020-11-25 PROCEDURE — 0241U HB NFCT DS VIR RESP RNA 4 TRGT: CPT | Performed by: PHYSICIAN ASSISTANT

## 2020-11-25 PROCEDURE — 99282 EMERGENCY DEPT VISIT SF MDM: CPT | Performed by: PHYSICIAN ASSISTANT

## 2020-11-25 PROCEDURE — 87651 STREP A DNA AMP PROBE: CPT | Performed by: PHYSICIAN ASSISTANT

## 2020-11-25 RX ORDER — ACETAMINOPHEN 325 MG/1
975 TABLET ORAL ONCE
Status: COMPLETED | OUTPATIENT
Start: 2020-11-25 | End: 2020-11-25

## 2020-11-25 RX ADMIN — ACETAMINOPHEN 975 MG: 325 TABLET, FILM COATED ORAL at 14:46

## 2020-11-28 ENCOUNTER — HOSPITAL ENCOUNTER (EMERGENCY)
Facility: HOSPITAL | Age: 20
Discharge: HOME | End: 2020-11-29
Attending: EMERGENCY MEDICINE
Payer: COMMERCIAL

## 2020-11-28 ENCOUNTER — APPOINTMENT (EMERGENCY)
Dept: RADIOLOGY | Facility: HOSPITAL | Age: 20
End: 2020-11-28
Payer: COMMERCIAL

## 2020-11-28 VITALS
HEIGHT: 62 IN | TEMPERATURE: 97.6 F | RESPIRATION RATE: 16 BRPM | BODY MASS INDEX: 23.21 KG/M2 | HEART RATE: 89 BPM | OXYGEN SATURATION: 98 % | WEIGHT: 126.1 LBS | DIASTOLIC BLOOD PRESSURE: 83 MMHG | SYSTOLIC BLOOD PRESSURE: 134 MMHG

## 2020-11-28 DIAGNOSIS — U07.1 COVID-19 VIRUS INFECTION: Primary | ICD-10-CM

## 2020-11-28 DIAGNOSIS — R11.0 NAUSEA: ICD-10-CM

## 2020-11-28 PROCEDURE — 71045 X-RAY EXAM CHEST 1 VIEW: CPT

## 2020-11-28 PROCEDURE — 99284 EMERGENCY DEPT VISIT MOD MDM: CPT

## 2020-11-28 PROCEDURE — 99284 EMERGENCY DEPT VISIT MOD MDM: CPT | Performed by: EMERGENCY MEDICINE

## 2020-11-28 RX ORDER — ALBUTEROL SULFATE 90 UG/1
2 AEROSOL, METERED RESPIRATORY (INHALATION) ONCE
Status: COMPLETED | OUTPATIENT
Start: 2020-11-28 | End: 2020-11-28

## 2020-11-28 RX ADMIN — ALBUTEROL SULFATE 2 PUFF: 90 AEROSOL, METERED RESPIRATORY (INHALATION) at 23:42

## 2020-11-29 RX ORDER — MELATONIN
1000 DAILY
Qty: 14 TABLET | Refills: 0 | Status: SHIPPED | OUTPATIENT
Start: 2020-11-29 | End: 2020-12-13

## 2020-11-29 RX ORDER — METOCLOPRAMIDE 10 MG/1
10 TABLET ORAL 4 TIMES DAILY
Qty: 28 TABLET | Refills: 0 | Status: SHIPPED | OUTPATIENT
Start: 2020-11-29 | End: 2020-12-06

## 2020-11-29 RX ORDER — MULTIVIT WITH MINERALS/LUTEIN
1000 TABLET ORAL DAILY
Qty: 14 TABLET | Refills: 0 | Status: SHIPPED | OUTPATIENT
Start: 2020-11-29 | End: 2020-12-13

## 2020-11-29 RX ORDER — ZINC SULFATE 50(220)MG
220 CAPSULE ORAL DAILY
Qty: 14 CAPSULE | Refills: 0 | Status: SHIPPED | OUTPATIENT
Start: 2020-11-29 | End: 2020-12-13

## 2021-04-13 DIAGNOSIS — Z23 ENCOUNTER FOR IMMUNIZATION: ICD-10-CM

## 2022-10-21 ENCOUNTER — OFFICE VISIT (OUTPATIENT)
Dept: URGENT CARE | Age: 22
End: 2022-10-21
Payer: COMMERCIAL

## 2022-10-21 VITALS
HEART RATE: 90 BPM | DIASTOLIC BLOOD PRESSURE: 71 MMHG | SYSTOLIC BLOOD PRESSURE: 139 MMHG | RESPIRATION RATE: 20 BRPM | OXYGEN SATURATION: 99 % | TEMPERATURE: 97.5 F

## 2022-10-21 DIAGNOSIS — J02.9 ACUTE PHARYNGITIS, UNSPECIFIED ETIOLOGY: Primary | ICD-10-CM

## 2022-10-21 PROCEDURE — 99213 OFFICE O/P EST LOW 20 MIN: CPT

## 2022-10-21 RX ORDER — AMOXICILLIN 500 MG/1
500 CAPSULE ORAL EVERY 12 HOURS SCHEDULED
Qty: 14 CAPSULE | Refills: 0 | Status: SHIPPED | OUTPATIENT
Start: 2022-10-21 | End: 2022-10-28

## 2022-10-22 NOTE — PROGRESS NOTES
[facility]        NAME: Delta Arreguin is a 19 y o  female  : 2000    MRN: [id number]  DATE: 2022  TIME: 8:43 PM    Assessment and Plan   Acute pharyngitis, unspecified etiology [J02 9]  1  Acute pharyngitis, unspecified etiology  amoxicillin (AMOXIL) 500 mg capsule       Patient Instructions     A biotics as discussed  Follow up with PCP in 3-5 days  Proceed to  ER if symptoms worsen  Chief Complaint     Chief Complaint   Patient presents with   • Sore Throat   • Shortness of Breath     SORE THROAT AND HEAD CONGESTION FOR 1 WEEK  PATIENT STATED  SHE IS HAVING A PROBLEM WHEN TAKING A DEEP BREATH IT STARTED TODAY  History of Present Illness       Patient presenting for evaluation of sore throat  Patient states that approximately 10 ago she started with a sore throat and congestion  She states 2 days after developing symptoms or congestion 1 away but her sore throat has persisted  She states that her sore throat is like “swallowing razor blades” and is worse in the morning and before bed  She denies any chest pain, nausea, vomiting, abdominal pain, diarrhea, fevers or chills  She does admit to a brief period of “shortness of breath” that lasted a couple minutes  She states that it happened yesterday and today  Patient states that she has a history of anxiety, and believes that her shortness of breath is related to over thinking about her sore throat  Patient states that she trialed Sudafed, Advil and NyQuil for symptoms with minimal relief  Review of Systems   Review of Systems   Constitutional: Negative for chills and fever  HENT: Positive for sore throat  Negative for ear pain  Eyes: Negative for pain and visual disturbance  Respiratory: Positive for shortness of breath  Negative for cough  Cardiovascular: Negative for chest pain and palpitations  Gastrointestinal: Negative for abdominal pain and vomiting     Genitourinary: Negative for dysuria and hematuria  Musculoskeletal: Negative for arthralgias and back pain  Skin: Negative for color change and rash  Neurological: Negative for seizures and syncope  All other systems reviewed and are negative          Current Medications       Current Outpatient Medications:   •  amoxicillin (AMOXIL) 500 mg capsule, Take 1 capsule (500 mg total) by mouth every 12 (twelve) hours for 7 days, Disp: 14 capsule, Rfl: 0  •  Ascorbic Acid (vitamin C) 1000 MG tablet, Take 1 tablet (1,000 mg total) by mouth daily for 14 days, Disp: 14 tablet, Rfl: 0  •  cholecalciferol (VITAMIN D3) 1,000 units tablet, Take 1 tablet (1,000 Units total) by mouth daily for 14 days, Disp: 14 tablet, Rfl: 0  •  dicyclomine (BENTYL) 20 mg tablet, Take 1 tablet (20 mg total) by mouth every 6 (six) hours as needed (abdominal cramping) (Patient not taking: Reported on 2020), Disp: 120 tablet, Rfl: 3  •  docusate sodium (COLACE) 100 mg capsule, Take 1 capsule (100 mg total) by mouth 2 (two) times a day (Patient not taking: Reported on 2020), Disp: 60 capsule, Rfl: 3  •  gabapentin (NEURONTIN) 100 mg capsule, Take 100 mg by mouth 2 (two) times a day, Disp: , Rfl:   •  zinc sulfate (ZINCATE) 220 mg capsule, Take 1 capsule (220 mg total) by mouth daily for 14 days, Disp: 14 capsule, Rfl: 0    Current Allergies     Allergies as of 10/21/2022   • (No Known Allergies)            The following portions of the patient's history were reviewed and updated as appropriate: allergies, current medications, past family history, past medical history, past social history, past surgical history and problem list      Past Medical History:   Diagnosis Date   • IBS (irritable bowel syndrome)    • Pregnancy    • Varicella     as child       Past Surgical History:   Procedure Laterality Date   • INDUCED      • WISDOM TOOTH EXTRACTION         Family History   Problem Relation Age of Onset   • Hypertension Father    • No Known Problems Mother    • Heart disease Maternal Grandfather         MI   • Diabetes Maternal Grandfather         type 2   • Hyperlipidemia Maternal Grandfather    • Hypertension Maternal Grandfather    • Cancer Maternal Grandfather         skin   • Diabetes Paternal Grandfather         type 2   • Cancer Paternal Grandfather         lymphoma and skin   • Heart disease Paternal Grandfather    • Diabetes Paternal Grandmother         type 2   • Heart disease Paternal Grandmother          Medications have been verified  Objective   /71   Pulse 90   Temp 97 5 °F (36 4 °C) (Temporal)   Resp 20   LMP 10/21/2022 Comment: ON HER PERIOD NOW  SpO2 99%        Physical Exam     Physical Exam  Vitals and nursing note reviewed  Constitutional:       General: She is not in acute distress  Appearance: Normal appearance  She is well-developed and normal weight  She is not ill-appearing, toxic-appearing or diaphoretic  HENT:      Head: Normocephalic and atraumatic  Right Ear: Tympanic membrane normal       Left Ear: Tympanic membrane normal       Nose: No congestion or rhinorrhea  Mouth/Throat:      Mouth: Mucous membranes are moist  No oral lesions  Pharynx: Oropharynx is clear  Posterior oropharyngeal erythema present  No pharyngeal swelling, oropharyngeal exudate or uvula swelling  Tonsils: No tonsillar exudate or tonsillar abscesses  1+ on the right  1+ on the left  Eyes:      General:         Right eye: No discharge  Left eye: No discharge  Extraocular Movements: Extraocular movements intact  Conjunctiva/sclera: Conjunctivae normal       Pupils: Pupils are equal, round, and reactive to light  Cardiovascular:      Rate and Rhythm: Normal rate and regular rhythm  Pulses: Normal pulses  Heart sounds: Normal heart sounds  No murmur heard  No friction rub  No gallop  Pulmonary:      Effort: Pulmonary effort is normal  No respiratory distress  Breath sounds: Normal breath sounds  No decreased breath sounds, wheezing, rhonchi or rales  Abdominal:      General: Abdomen is flat  Bowel sounds are normal       Palpations: Abdomen is soft  Tenderness: There is no abdominal tenderness  There is no guarding or rebound  Musculoskeletal:         General: No tenderness  Normal range of motion  Cervical back: Normal range of motion and neck supple  Skin:     General: Skin is warm and dry  Capillary Refill: Capillary refill takes less than 2 seconds  Neurological:      General: No focal deficit present  Mental Status: She is alert and oriented to person, place, and time     Psychiatric:         Mood and Affect: Mood normal          Behavior: Behavior normal

## 2022-10-22 NOTE — PATIENT INSTRUCTIONS
Please take antibiotics once in the morning once at night for the next 7 days  1   Drink plenty fluids  2   Take probiotics [i e  Yogurt, Acidophilus, Florastor (liquid)] daily  3   Over-the-counter medications as needed for symptomatic care  4    Advance activities as tolerated  5    Follow-up with your primary care physician in 3-4 days  6   Go to emergency room if symptoms are worsening      7   Use a humidifier at bedtime

## 2022-11-07 ENCOUNTER — OFFICE VISIT (OUTPATIENT)
Dept: URGENT CARE | Age: 22
End: 2022-11-07

## 2022-11-07 VITALS
DIASTOLIC BLOOD PRESSURE: 70 MMHG | TEMPERATURE: 97.4 F | BODY MASS INDEX: 28.35 KG/M2 | HEART RATE: 93 BPM | WEIGHT: 155 LBS | OXYGEN SATURATION: 99 % | RESPIRATION RATE: 20 BRPM | SYSTOLIC BLOOD PRESSURE: 135 MMHG

## 2022-11-07 DIAGNOSIS — R35.0 URINARY FREQUENCY: Primary | ICD-10-CM

## 2022-11-08 NOTE — PROGRESS NOTES
[address] [facility]        NAME: Russ Pina is a 23 y o  female  : 2000    MRN: [id number]  DATE: 2022  TIME: 8:04 PM    Assessment and Plan   Urinary frequency [R35 0]  1  Urinary frequency  Urine culture    CANCELED: POCT urine dip     Unable to obtain urine dip at this time due to recent use of azo  Pending urine culture results  Discussed with patient that if culture is positive, we will initiate antibiotic therapy she agrees with this plan of care, all questions and concerns were addressed during this visit  Patient Instructions     Follow up with PCP in 3-5 days  Proceed to  ER if symptoms worsen  Chief Complaint     Chief Complaint   Patient presents with   • Urinary Frequency     Urinary frequency since yesterday         History of Present Illness       Patient presenting for evaluation of UTI like symptoms including urinary frequency, burning with urination and “uncomfortable” feeling  Patient states that the symptoms started 1 day ago  She denies any fevers, chills, flank pain or suprapubic pain  Patient states that she has had UTIs in the past, most recently treated 4-6 months ago  She states that she is been taking azo for her symptoms with minimal relief  Difficulty Urinating   Associated symptoms include frequency and urgency  Pertinent negatives include no chills, hematuria or vomiting  Review of Systems   Review of Systems   Constitutional: Negative for chills and fever  HENT: Negative for ear pain and sore throat  Eyes: Negative for pain and visual disturbance  Respiratory: Negative for cough and shortness of breath  Cardiovascular: Negative for chest pain and palpitations  Gastrointestinal: Negative for abdominal pain and vomiting  Genitourinary: Positive for dysuria, frequency and urgency  Negative for hematuria  Musculoskeletal: Negative for arthralgias and back pain  Skin: Negative for color change and rash     Neurological: Negative for seizures and syncope  All other systems reviewed and are negative          Current Medications       Current Outpatient Medications:   •  Ascorbic Acid (vitamin C) 1000 MG tablet, Take 1 tablet (1,000 mg total) by mouth daily for 14 days, Disp: 14 tablet, Rfl: 0  •  cholecalciferol (VITAMIN D3) 1,000 units tablet, Take 1 tablet (1,000 Units total) by mouth daily for 14 days, Disp: 14 tablet, Rfl: 0  •  dicyclomine (BENTYL) 20 mg tablet, Take 1 tablet (20 mg total) by mouth every 6 (six) hours as needed (abdominal cramping) (Patient not taking: Reported on 2020), Disp: 120 tablet, Rfl: 3  •  docusate sodium (COLACE) 100 mg capsule, Take 1 capsule (100 mg total) by mouth 2 (two) times a day (Patient not taking: Reported on 2020), Disp: 60 capsule, Rfl: 3  •  gabapentin (NEURONTIN) 100 mg capsule, Take 100 mg by mouth 2 (two) times a day, Disp: , Rfl:   •  zinc sulfate (ZINCATE) 220 mg capsule, Take 1 capsule (220 mg total) by mouth daily for 14 days, Disp: 14 capsule, Rfl: 0    Current Allergies     Allergies as of 2022   • (No Known Allergies)            The following portions of the patient's history were reviewed and updated as appropriate: allergies, current medications, past family history, past medical history, past social history, past surgical history and problem list      Past Medical History:   Diagnosis Date   • IBS (irritable bowel syndrome)    • Pregnancy    • Varicella     as child       Past Surgical History:   Procedure Laterality Date   • INDUCED      • WISDOM TOOTH EXTRACTION         Family History   Problem Relation Age of Onset   • Hypertension Father    • No Known Problems Mother    • Heart disease Maternal Grandfather         MI   • Diabetes Maternal Grandfather         type 2   • Hyperlipidemia Maternal Grandfather    • Hypertension Maternal Grandfather    • Cancer Maternal Grandfather         skin   • Diabetes Paternal Grandfather         type 2   • Cancer Paternal Grandfather         lymphoma and skin   • Heart disease Paternal Grandfather    • Diabetes Paternal Grandmother         type 2   • Heart disease Paternal Grandmother          Medications have been verified  Objective   /70   Pulse 93   Temp (!) 97 4 °F (36 3 °C) (Temporal)   Resp 20   Wt 70 3 kg (155 lb)   LMP 10/21/2022 Comment: ON HER PERIOD NOW  SpO2 99%   BMI 28 35 kg/m²        Physical Exam     Physical Exam  Vitals and nursing note reviewed  Constitutional:       General: She is not in acute distress  Appearance: Normal appearance  She is normal weight  She is not ill-appearing, toxic-appearing or diaphoretic  HENT:      Head: Normocephalic and atraumatic  Right Ear: Tympanic membrane normal       Left Ear: Tympanic membrane normal       Nose: Nose normal  No congestion or rhinorrhea  Mouth/Throat:      Mouth: Mucous membranes are moist       Pharynx: Oropharynx is clear  No oropharyngeal exudate or posterior oropharyngeal erythema  Eyes:      General:         Right eye: No discharge  Left eye: No discharge  Extraocular Movements: Extraocular movements intact  Conjunctiva/sclera: Conjunctivae normal       Pupils: Pupils are equal, round, and reactive to light  Cardiovascular:      Rate and Rhythm: Normal rate and regular rhythm  Pulses: Normal pulses  Heart sounds: Normal heart sounds  No murmur heard  No friction rub  No gallop  Pulmonary:      Effort: Pulmonary effort is normal  No respiratory distress  Breath sounds: Normal breath sounds  No stridor  No wheezing, rhonchi or rales  Chest:      Chest wall: No tenderness  Abdominal:      General: Abdomen is flat  Bowel sounds are normal       Palpations: Abdomen is soft  Tenderness: There is no abdominal tenderness  There is no right CVA tenderness, left CVA tenderness, guarding or rebound  Musculoskeletal:         General: No tenderness   Normal range of motion  Cervical back: Normal range of motion and neck supple  No tenderness  Skin:     General: Skin is warm and dry  Capillary Refill: Capillary refill takes less than 2 seconds  Neurological:      General: No focal deficit present  Mental Status: She is alert and oriented to person, place, and time     Psychiatric:         Mood and Affect: Mood normal          Behavior: Behavior normal

## 2022-11-08 NOTE — PATIENT INSTRUCTIONS
Please let 24-48 hours for urine culture to result  If it is positive, we will contact you and initiate antibiotic therapy  Continue taking azo as needed for symptoms

## 2022-11-09 LAB — BACTERIA UR CULT: ABNORMAL

## 2022-11-30 ENCOUNTER — TELEPHONE (OUTPATIENT)
Dept: OBGYN CLINIC | Facility: CLINIC | Age: 22
End: 2022-11-30

## 2022-12-01 DIAGNOSIS — Z30.431 ENCOUNTER FOR ROUTINE CHECKING OF INTRAUTERINE CONTRACEPTIVE DEVICE (IUD): Primary | ICD-10-CM

## 2022-12-01 NOTE — TELEPHONE ENCOUNTER
Spoke to patient, she said she is having pregnancy symptoms and cant feel the IUD strings  Offered an appt for today but unable to make it due to work  Patient was instructed to take a pregnancy test just to make sure and I offered her an US to complete to make sure it is in place  Provided cent  Scheduling number  Told her we would be in touch once we get results

## 2023-08-03 ENCOUNTER — HOSPITAL ENCOUNTER (EMERGENCY)
Facility: HOSPITAL | Age: 23
Discharge: HOME | End: 2023-08-03
Attending: EMERGENCY MEDICINE
Payer: COMMERCIAL

## 2023-08-03 ENCOUNTER — APPOINTMENT (EMERGENCY)
Dept: CT IMAGING | Facility: HOSPITAL | Age: 23
End: 2023-08-03
Payer: COMMERCIAL

## 2023-08-03 VITALS
SYSTOLIC BLOOD PRESSURE: 119 MMHG | DIASTOLIC BLOOD PRESSURE: 61 MMHG | OXYGEN SATURATION: 99 % | RESPIRATION RATE: 17 BRPM | HEART RATE: 73 BPM | TEMPERATURE: 98.5 F

## 2023-08-03 DIAGNOSIS — R03.0 ELEVATED BLOOD PRESSURE READING: ICD-10-CM

## 2023-08-03 DIAGNOSIS — R11.10 VOMITING: ICD-10-CM

## 2023-08-03 DIAGNOSIS — R51.9 ACUTE HEADACHE: Primary | ICD-10-CM

## 2023-08-03 LAB
EXT PREGNANCY TEST URINE: NEGATIVE
EXT. CONTROL: NORMAL

## 2023-08-03 PROCEDURE — G1004 CDSM NDSC: HCPCS

## 2023-08-03 PROCEDURE — 99284 EMERGENCY DEPT VISIT MOD MDM: CPT

## 2023-08-03 PROCEDURE — 99284 EMERGENCY DEPT VISIT MOD MDM: CPT | Performed by: EMERGENCY MEDICINE

## 2023-08-03 PROCEDURE — 81025 URINE PREGNANCY TEST: CPT | Performed by: EMERGENCY MEDICINE

## 2023-08-03 PROCEDURE — 70450 CT HEAD/BRAIN W/O DYE: CPT

## 2023-08-03 RX ORDER — ONDANSETRON 4 MG/1
4 TABLET, ORALLY DISINTEGRATING ORAL ONCE
Status: DISCONTINUED | OUTPATIENT
Start: 2023-08-03 | End: 2023-08-03

## 2023-08-03 RX ORDER — METOCLOPRAMIDE 10 MG/1
10 TABLET ORAL ONCE
Status: COMPLETED | OUTPATIENT
Start: 2023-08-03 | End: 2023-08-03

## 2023-08-03 RX ORDER — ONDANSETRON 4 MG/1
4 TABLET, ORALLY DISINTEGRATING ORAL ONCE
Status: DISCONTINUED | OUTPATIENT
Start: 2023-08-03 | End: 2023-08-03 | Stop reason: HOSPADM

## 2023-08-03 RX ORDER — ACETAMINOPHEN 325 MG/1
650 TABLET ORAL ONCE
Status: COMPLETED | OUTPATIENT
Start: 2023-08-03 | End: 2023-08-03

## 2023-08-03 RX ADMIN — METOCLOPRAMIDE 10 MG: 10 TABLET ORAL at 14:50

## 2023-08-03 RX ADMIN — ACETAMINOPHEN 650 MG: 325 TABLET, FILM COATED ORAL at 14:49
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
## 2024-02-21 PROBLEM — Z01.419 GYNECOLOGIC EXAM NORMAL: Status: RESOLVED | Noted: 2019-03-28 | Resolved: 2024-02-21

## 2024-04-01 ENCOUNTER — OFFICE VISIT (OUTPATIENT)
Dept: OBGYN CLINIC | Facility: CLINIC | Age: 24
End: 2024-04-01
Payer: COMMERCIAL

## 2024-04-01 VITALS
BODY MASS INDEX: 22.26 KG/M2 | SYSTOLIC BLOOD PRESSURE: 120 MMHG | DIASTOLIC BLOOD PRESSURE: 80 MMHG | WEIGHT: 121 LBS | HEIGHT: 62 IN

## 2024-04-01 DIAGNOSIS — Z11.3 SCREENING FOR STD (SEXUALLY TRANSMITTED DISEASE): ICD-10-CM

## 2024-04-01 DIAGNOSIS — N88.8 CERVICAL DISCHARGE: ICD-10-CM

## 2024-04-01 DIAGNOSIS — N90.89 VULVAR LESION: Primary | ICD-10-CM

## 2024-04-01 DIAGNOSIS — Z12.4 SCREENING FOR CERVICAL CANCER: ICD-10-CM

## 2024-04-01 DIAGNOSIS — N89.8 VAGINAL DISCHARGE: ICD-10-CM

## 2024-04-01 PROCEDURE — 99203 OFFICE O/P NEW LOW 30 MIN: CPT | Performed by: STUDENT IN AN ORGANIZED HEALTH CARE EDUCATION/TRAINING PROGRAM

## 2024-04-01 PROCEDURE — 87529 HSV DNA AMP PROBE: CPT | Performed by: STUDENT IN AN ORGANIZED HEALTH CARE EDUCATION/TRAINING PROGRAM

## 2024-04-01 PROCEDURE — 87563 M. GENITALIUM AMP PROBE: CPT | Performed by: STUDENT IN AN ORGANIZED HEALTH CARE EDUCATION/TRAINING PROGRAM

## 2024-04-01 PROCEDURE — 87661 TRICHOMONAS VAGINALIS AMPLIF: CPT | Performed by: STUDENT IN AN ORGANIZED HEALTH CARE EDUCATION/TRAINING PROGRAM

## 2024-04-01 PROCEDURE — G0145 SCR C/V CYTO,THINLAYER,RESCR: HCPCS | Performed by: PATHOLOGY

## 2024-04-01 RX ORDER — VALACYCLOVIR HYDROCHLORIDE 1 G/1
1000 TABLET, FILM COATED ORAL 2 TIMES DAILY
Qty: 10 TABLET | Refills: 0 | Status: SHIPPED | OUTPATIENT
Start: 2024-04-01 | End: 2024-04-06

## 2024-04-01 RX ORDER — FLUOXETINE HYDROCHLORIDE 20 MG/1
20 CAPSULE ORAL DAILY
COMMUNITY
Start: 2024-02-22 | End: 2024-05-22

## 2024-04-01 RX ORDER — LIDOCAINE 50 MG/G
OINTMENT TOPICAL 2 TIMES DAILY PRN
Qty: 30 G | Refills: 0 | Status: SHIPPED | OUTPATIENT
Start: 2024-04-01

## 2024-04-01 NOTE — PROGRESS NOTES
Assessment/Plan:  Ewa Oreilly is a 23 y.o.  with vulvar and vaginal irritation who presents for consultation    No problem-specific Assessment & Plan notes found for this encounter.       Diagnoses and all orders for this visit:    Vulvar lesion  -     valACYclovir (VALTREX) 1,000 mg tablet; Take 1 tablet (1,000 mg total) by mouth 2 (two) times a day for 5 days  -     lidocaine (XYLOCAINE) 5 % ointment; Apply topically 2 (two) times a day as needed for mild pain or moderate pain  -     HSV TYPE 1,2 DNA PCR    Vaginal discharge  -     Trichomonas vaginalis/Mycoplasma genitalium PCR  -     HSV TYPE 1,2 DNA PCR    Screening for cervical cancer  -     Liquid-based pap, screening    Cervical discharge    Screening for STD (sexually transmitted disease)  -     Trichomonas vaginalis/Mycoplasma genitalium PCR    Other orders  -     FLUoxetine (PROzac) 20 mg capsule; Take 20 mg by mouth daily          Recommend stopping boric acid--may be chemical irritation to labia that caused erosion, however, also discussed ulcerations could be HSV  HSV swab collected, Recommend valtrex in event HSV+ and topical lidocaine ointment (vs bacitracin with pain relief)  Pap collected given cervical discharge and irritation  Recently negative GC/CT, trich and mycoplasma collected today  Wet mount neg for yeast or BV    Subjective:      Patient ID: Ewa Oreilly is a 23 y.o. female.    HPI  Noted vaginal irritation  Suffers from recurrent BV which she usually treats with boric acid  Had been using Boric acid twice daily  Was seen in ED given increase vaginal and vulvar pain, discharge, smell  Neg gc/ct, was told had BV and given flagyl  Never had this kind of pain before    The following portions of the patient's history were reviewed and updated as appropriate: allergies, current medications, past medical history, past social history, past surgical history, and problem list.    Review of Systems  --per HPI    Objective:  /80    "Ht 5' 2\" (1.575 m)   Wt 54.9 kg (121 lb)   LMP 03/23/2024 (Exact Date)   BMI 22.13 kg/m²      Physical Exam  Constitutional:       Appearance: Normal appearance.   HENT:      Head: Normocephalic and atraumatic.   Eyes:      Extraocular Movements: Extraocular movements intact.      Conjunctiva/sclera: Conjunctivae normal.   Pulmonary:      Effort: Pulmonary effort is normal.   Genitourinary:     Comments: Vulva: <1cm erosions as 6 o'clock and 9 o'clock, ttp  Vagina: thick white discharge without distinct lesions  Urethra: normal, no lesions, masses or ttp  Bladder: normal, no masses or ttp  Cervix: cervical discharge, erythema without lesions, no CMT    Musculoskeletal:         General: Normal range of motion.      Cervical back: Normal range of motion.   Skin:     General: Skin is warm and dry.   Neurological:      General: No focal deficit present.      Mental Status: She is alert.   Psychiatric:         Mood and Affect: Mood normal.         Behavior: Behavior normal.         Thought Content: Thought content normal.         wet mount: neg whiff, neg trich, neg BV, neg yeast   "

## 2024-04-02 LAB
HSV1 DNA SPEC QL NAA+PROBE: DETECTED
HSV1 DNA SPEC QL NAA+PROBE: NOT DETECTED
M GENITALIUM DNA SPEC QL NAA+PROBE: NEGATIVE
T VAGINALIS DNA SPEC QL NAA+PROBE: NEGATIVE

## 2024-04-13 DIAGNOSIS — N90.89 VULVAR LESION: ICD-10-CM

## 2024-04-14 LAB
LAB AP GYN PRIMARY INTERPRETATION: ABNORMAL
Lab: ABNORMAL
PATH INTERP SPEC-IMP: ABNORMAL

## 2024-04-15 LAB
HPV HR 12 DNA CVX QL NAA+PROBE: POSITIVE
HPV16 DNA CVX QL NAA+PROBE: NEGATIVE
HPV18 DNA CVX QL NAA+PROBE: NEGATIVE

## 2024-04-16 RX ORDER — VALACYCLOVIR HYDROCHLORIDE 1 G/1
1000 TABLET, FILM COATED ORAL 2 TIMES DAILY
Qty: 10 TABLET | Refills: 0 | Status: SHIPPED | OUTPATIENT
Start: 2024-04-16 | End: 2024-04-21

## 2024-04-17 ENCOUNTER — PATIENT MESSAGE (OUTPATIENT)
Dept: OBGYN CLINIC | Facility: CLINIC | Age: 24
End: 2024-04-17

## 2024-05-06 ENCOUNTER — NURSE TRIAGE (OUTPATIENT)
Age: 24
End: 2024-05-06

## 2024-05-06 ENCOUNTER — APPOINTMENT (EMERGENCY)
Dept: ULTRASOUND IMAGING | Facility: HOSPITAL | Age: 24
End: 2024-05-06

## 2024-05-06 ENCOUNTER — HOSPITAL ENCOUNTER (EMERGENCY)
Facility: HOSPITAL | Age: 24
Discharge: HOME | End: 2024-05-06
Attending: EMERGENCY MEDICINE | Admitting: EMERGENCY MEDICINE

## 2024-05-06 VITALS
SYSTOLIC BLOOD PRESSURE: 127 MMHG | RESPIRATION RATE: 18 BRPM | DIASTOLIC BLOOD PRESSURE: 78 MMHG | TEMPERATURE: 98.4 F | OXYGEN SATURATION: 99 % | HEART RATE: 87 BPM

## 2024-05-06 DIAGNOSIS — O20.0 THREATENED MISCARRIAGE IN EARLY PREGNANCY: ICD-10-CM

## 2024-05-06 DIAGNOSIS — Z87.42 HISTORY OF HEAVY VAGINAL BLEEDING: ICD-10-CM

## 2024-05-06 DIAGNOSIS — Z32.01 POSITIVE PREGNANCY TEST: Primary | ICD-10-CM

## 2024-05-06 DIAGNOSIS — N93.9 VAGINAL BLEEDING: Primary | ICD-10-CM

## 2024-05-06 LAB
ALBUMIN SERPL BCP-MCNC: 4.4 G/DL (ref 3.5–5)
ALP SERPL-CCNC: 90 U/L (ref 34–104)
ALT SERPL W P-5'-P-CCNC: 10 U/L (ref 7–52)
ANION GAP SERPL CALCULATED.3IONS-SCNC: 8 MMOL/L (ref 4–13)
AST SERPL W P-5'-P-CCNC: 12 U/L (ref 13–39)
B-HCG SERPL-ACNC: 33.1 MIU/ML (ref 0–5)
BASOPHILS # BLD AUTO: 0.03 THOUSANDS/ÂΜL (ref 0–0.1)
BASOPHILS NFR BLD AUTO: 0 % (ref 0–1)
BILIRUB SERPL-MCNC: 1.3 MG/DL (ref 0.2–1)
BILIRUB UR QL STRIP: NEGATIVE
BUN SERPL-MCNC: 18 MG/DL (ref 5–25)
CALCIUM SERPL-MCNC: 9.1 MG/DL (ref 8.4–10.2)
CHLORIDE SERPL-SCNC: 106 MMOL/L (ref 96–108)
CLARITY UR: CLEAR
CO2 SERPL-SCNC: 25 MMOL/L (ref 21–32)
COLOR UR: NORMAL
CREAT SERPL-MCNC: 0.71 MG/DL (ref 0.6–1.3)
EOSINOPHIL # BLD AUTO: 0.05 THOUSAND/ÂΜL (ref 0–0.61)
EOSINOPHIL NFR BLD AUTO: 1 % (ref 0–6)
ERYTHROCYTE [DISTWIDTH] IN BLOOD BY AUTOMATED COUNT: 14 % (ref 11.6–15.1)
EXT PREGNANCY TEST URINE: NEGATIVE
EXT. CONTROL: NORMAL
GFR SERPL CREATININE-BSD FRML MDRD: 118 ML/MIN/1.73SQ M
GLUCOSE SERPL-MCNC: 92 MG/DL (ref 65–140)
GLUCOSE UR STRIP-MCNC: NEGATIVE MG/DL
HCT VFR BLD AUTO: 38.7 % (ref 34.8–46.1)
HGB BLD-MCNC: 12.3 G/DL (ref 11.5–15.4)
HGB UR QL STRIP.AUTO: NEGATIVE
IMM GRANULOCYTES # BLD AUTO: 0.02 THOUSAND/UL (ref 0–0.2)
IMM GRANULOCYTES NFR BLD AUTO: 0 % (ref 0–2)
KETONES UR STRIP-MCNC: NEGATIVE MG/DL
LEUKOCYTE ESTERASE UR QL STRIP: NEGATIVE
LIPASE SERPL-CCNC: 9 U/L (ref 11–82)
LYMPHOCYTES # BLD AUTO: 2.12 THOUSANDS/ÂΜL (ref 0.6–4.47)
LYMPHOCYTES NFR BLD AUTO: 24 % (ref 14–44)
MCH RBC QN AUTO: 30.1 PG (ref 26.8–34.3)
MCHC RBC AUTO-ENTMCNC: 31.8 G/DL (ref 31.4–37.4)
MCV RBC AUTO: 95 FL (ref 82–98)
MONOCYTES # BLD AUTO: 0.54 THOUSAND/ÂΜL (ref 0.17–1.22)
MONOCYTES NFR BLD AUTO: 6 % (ref 4–12)
NEUTROPHILS # BLD AUTO: 6.24 THOUSANDS/ÂΜL (ref 1.85–7.62)
NEUTS SEG NFR BLD AUTO: 69 % (ref 43–75)
NITRITE UR QL STRIP: NEGATIVE
NRBC BLD AUTO-RTO: 0 /100 WBCS
PH UR STRIP.AUTO: 5.5 [PH]
PLATELET # BLD AUTO: 279 THOUSANDS/UL (ref 149–390)
PMV BLD AUTO: 10 FL (ref 8.9–12.7)
POTASSIUM SERPL-SCNC: 3.8 MMOL/L (ref 3.5–5.3)
PROT SERPL-MCNC: 7 G/DL (ref 6.4–8.4)
PROT UR STRIP-MCNC: NEGATIVE MG/DL
RBC # BLD AUTO: 4.09 MILLION/UL (ref 3.81–5.12)
SODIUM SERPL-SCNC: 139 MMOL/L (ref 135–147)
SP GR UR STRIP.AUTO: 1.03 (ref 1–1.03)
UROBILINOGEN UR STRIP-ACNC: <2 MG/DL
WBC # BLD AUTO: 9 THOUSAND/UL (ref 4.31–10.16)

## 2024-05-06 PROCEDURE — 99285 EMERGENCY DEPT VISIT HI MDM: CPT | Performed by: EMERGENCY MEDICINE

## 2024-05-06 PROCEDURE — 80053 COMPREHEN METABOLIC PANEL: CPT

## 2024-05-06 PROCEDURE — 83690 ASSAY OF LIPASE: CPT

## 2024-05-06 PROCEDURE — 87086 URINE CULTURE/COLONY COUNT: CPT

## 2024-05-06 PROCEDURE — 99284 EMERGENCY DEPT VISIT MOD MDM: CPT

## 2024-05-06 PROCEDURE — 84702 CHORIONIC GONADOTROPIN TEST: CPT

## 2024-05-06 PROCEDURE — 36415 COLL VENOUS BLD VENIPUNCTURE: CPT

## 2024-05-06 PROCEDURE — 76815 OB US LIMITED FETUS(S): CPT

## 2024-05-06 PROCEDURE — 85025 COMPLETE CBC W/AUTO DIFF WBC: CPT

## 2024-05-06 PROCEDURE — 81025 URINE PREGNANCY TEST: CPT

## 2024-05-06 PROCEDURE — 81003 URINALYSIS AUTO W/O SCOPE: CPT

## 2024-05-06 NOTE — Clinical Note
Ewa Oreilly was seen and treated in our emergency department on 5/6/2024.                Diagnosis:     Ewa  .    She may return on this date: 05/13/2024    Can return sooner if feeling okay     If you have any questions or concerns, please don't hesitate to call.      Reema Maza MD    ______________________________           _______________          _______________  Hospital Representative                              Date                                Time

## 2024-05-06 NOTE — ED PROVIDER NOTES
History  Chief Complaint   Patient presents with    Vaginal Bleeding     Positive pregnancy test . Started with vaginal bleeding this morning, pad every 2 hours.+cramping.     23-year-old female , presenting to ED for evaluation of vaginal bleeding, lower abdominal cramping which started today.  LMP 2024, bleeding was lighter than usual, bled for 3 days.  Checked UPT on  which was negative.  Bleeding again started yesterday, but worsened overnight and this morning, patient called OB last night, took another pregnancy test which was positive. Changing regular sized tampons q 2 hrs. No clots. Sexually active with 1 male partner. Planned pregnancy. No clots. No anemia sx's. No vaginal discharge. Associated nausea. No vomiting. Denies CP, fever, chills, SOB, urinary sx's.        Prior to Admission Medications   Prescriptions Last Dose Informant Patient Reported? Taking?   Ascorbic Acid (vitamin C) 1000 MG tablet   No No   Sig: Take 1 tablet (1,000 mg total) by mouth daily for 14 days   FLUoxetine (PROzac) 20 mg capsule   Yes No   Sig: Take 20 mg by mouth daily   cholecalciferol (VITAMIN D3) 1,000 units tablet   No No   Sig: Take 1 tablet (1,000 Units total) by mouth daily for 14 days   dicyclomine (BENTYL) 20 mg tablet   No No   Sig: Take 1 tablet (20 mg total) by mouth every 6 (six) hours as needed (abdominal cramping)   Patient not taking: Reported on 2020   docusate sodium (COLACE) 100 mg capsule   No No   Sig: Take 1 capsule (100 mg total) by mouth 2 (two) times a day   Patient not taking: Reported on 2020   gabapentin (NEURONTIN) 100 mg capsule   Yes No   Sig: Take 100 mg by mouth 2 (two) times a day   Patient not taking: Reported on 2024   lidocaine (XYLOCAINE) 5 % ointment   No No   Sig: Apply topically 2 (two) times a day as needed for mild pain or moderate pain   valACYclovir (VALTREX) 1,000 mg tablet   No No   Sig: Take 1 tablet (1,000 mg total) by mouth 2 (two) times a day  for 5 days   zinc sulfate (ZINCATE) 220 mg capsule   No No   Sig: Take 1 capsule (220 mg total) by mouth daily for 14 days      Facility-Administered Medications: None       Past Medical History:   Diagnosis Date    IBS (irritable bowel syndrome)     Pregnancy     Varicella     as child       Past Surgical History:   Procedure Laterality Date    INDUCED       WISDOM TOOTH EXTRACTION         Family History   Problem Relation Age of Onset    Hypertension Father     No Known Problems Mother     Heart disease Maternal Grandfather         MI    Diabetes Maternal Grandfather         type 2    Hyperlipidemia Maternal Grandfather     Hypertension Maternal Grandfather     Cancer Maternal Grandfather         skin    Diabetes Paternal Grandfather         type 2    Cancer Paternal Grandfather         lymphoma and skin    Heart disease Paternal Grandfather     Diabetes Paternal Grandmother         type 2    Heart disease Paternal Grandmother      I have reviewed and agree with the history as documented.    E-Cigarette/Vaping    E-Cigarette Use Never User      E-Cigarette/Vaping Substances     Social History     Tobacco Use    Smoking status: Never    Smokeless tobacco: Never   Vaping Use    Vaping status: Never Used   Substance Use Topics    Alcohol use: Yes    Drug use: No        Review of Systems   Constitutional:  Negative for chills and fever.   HENT:  Negative for ear pain and sore throat.    Eyes:  Negative for pain and visual disturbance.   Respiratory:  Negative for cough and shortness of breath.    Cardiovascular:  Negative for chest pain and palpitations.   Gastrointestinal:  Positive for abdominal pain and nausea. Negative for vomiting.   Genitourinary:  Positive for vaginal bleeding. Negative for dysuria and hematuria.   Musculoskeletal:  Negative for arthralgias and back pain.   Skin:  Negative for color change and rash.   Neurological:  Negative for seizures and syncope.   All other systems reviewed and are  negative.      Physical Exam  ED Triage Vitals [05/06/24 1301]   Temperature Pulse Respirations Blood Pressure SpO2   98.4 °F (36.9 °C) 87 18 127/78 99 %      Temp Source Heart Rate Source Patient Position - Orthostatic VS BP Location FiO2 (%)   Oral Monitor Sitting Right arm --      Pain Score       --             Orthostatic Vital Signs  Vitals:    05/06/24 1301   BP: 127/78   Pulse: 87   Patient Position - Orthostatic VS: Sitting       Physical Exam  Vitals reviewed.   Constitutional:       General: She is not in acute distress.     Appearance: She is well-developed.   HENT:      Head: Normocephalic and atraumatic.   Eyes:      Conjunctiva/sclera: Conjunctivae normal.   Cardiovascular:      Rate and Rhythm: Normal rate and regular rhythm.      Pulses:           Radial pulses are 2+ on the right side and 2+ on the left side.        Dorsalis pedis pulses are 2+ on the right side and 2+ on the left side.      Heart sounds: Normal heart sounds, S1 normal and S2 normal. No murmur heard.  Pulmonary:      Effort: Pulmonary effort is normal. No respiratory distress.      Breath sounds: Normal breath sounds and air entry.   Abdominal:      Palpations: Abdomen is soft.      Tenderness: There is abdominal tenderness in the right lower quadrant, suprapubic area and left lower quadrant.      Comments: Mild lower ab tenderness to palpation, nonperitoneal   Musculoskeletal:         General: No swelling.      Cervical back: Neck supple.      Right lower leg: No edema.      Left lower leg: No edema.   Skin:     General: Skin is warm and dry.      Capillary Refill: Capillary refill takes less than 2 seconds.   Neurological:      Mental Status: She is alert.   Psychiatric:         Mood and Affect: Mood normal.         ED Medications  Medications - No data to display    Diagnostic Studies  Results Reviewed       Procedure Component Value Units Date/Time    hCG, quantitative [[id number]]  (Abnormal) Collected: 05/06/24 1438    Lab  Status: Final result Specimen: Blood from Arm, Right Updated: 05/06/24 1507     HCG, Quant 33.1 mIU/mL     Narrative:       Expected Ranges:    HCG results between 5.0 and 25.0 mIU/mL may be indicative of early pregnancy but should be interpreted in light of the total clinical presentation.    HCG can rise to detectable levels in swetha and post menopausal women (0-11.6 mIU/mL).     Approximate               Approximate HCG  Gestation age          Concentration ( mIU/mL)  _____________          ______________________   Weeks                      HCG values  0.2-1                       5-50  1-2                           2-3                         100-5000  3-4                         500-70057  4-5                         1000-37882  5-6                         [id number]  6-8                         33721-396128  8-12                        [id number]      UA w Reflex to Microscopic w Reflex to Culture [[id number]] Collected: 05/06/24 1433    Lab Status: Final result Specimen: Urine, Clean Catch Updated: 05/06/24 1505     Color, UA Light Yellow     Clarity, UA Clear     Specific Gravity, UA 1.026     pH, UA 5.5     Leukocytes, UA Negative     Nitrite, UA Negative     Protein, UA Negative mg/dl      Glucose, UA Negative mg/dl      Ketones, UA Negative mg/dl      Urobilinogen, UA <2.0 mg/dl      Bilirubin, UA Negative     Occult Blood, UA Negative     URINE COMMENT --    Urine culture [[id number]] Collected: 05/06/24 1433    Lab Status: In process Specimen: Urine, Clean Catch Updated: 05/06/24 1505    Comprehensive metabolic panel [[id number]]  (Abnormal) Collected: 05/06/24 1438    Lab Status: Final result Specimen: Blood from Arm, Right Updated: 05/06/24 1502     Sodium 139 mmol/L      Potassium 3.8 mmol/L      Chloride 106 mmol/L      CO2 25 mmol/L      ANION GAP 8 mmol/L      BUN 18 mg/dL      Creatinine 0.71 mg/dL      Glucose 92 mg/dL      Calcium 9.1 mg/dL      AST 12 U/L      ALT 10 U/L      Alkaline  Phosphatase 90 U/L      Total Protein 7.0 g/dL      Albumin 4.4 g/dL      Total Bilirubin 1.30 mg/dL      eGFR 118 ml/min/1.73sq m     Narrative:      National Kid[employer] Foundation guidelines for Chronic Kidney Disease (CKD):     Stage 1 with normal or high GFR (GFR > 90 mL/min/1.73 square meters)    Stage 2 Mild CKD (GFR = 60-89 mL/min/1.73 square meters)    Stage 3A Moderate CKD (GFR = 45-59 mL/min/1.73 square meters)    Stage 3B Moderate CKD (GFR = 30-44 mL/min/1.73 square meters)    Stage 4 Severe CKD (GFR = 15-29 mL/min/1.73 square meters)    Stage 5 End Stage CKD (GFR <15 mL/min/1.73 square meters)  Note: GFR calculation is accurate only with a steady state creatinine    Lipase [[id number]]  (Abnormal) Collected: 05/06/24 1438    Lab Status: Final result Specimen: Blood from Arm, Right Updated: 05/06/24 1502     Lipase 9 u/L     CBC and differential [[id number]] Collected: 05/06/24 1438    Lab Status: Final result Specimen: Blood from Arm, Right Updated: 05/06/24 1447     WBC 9.00 Thousand/uL      RBC 4.09 Million/uL      Hemoglobin 12.3 g/dL      Hematocrit 38.7 %      MCV 95 fL      MCH 30.1 pg      MCHC 31.8 g/dL      RDW 14.0 %      MPV 10.0 fL      Platelets 279 Thousands/uL      nRBC 0 /100 WBCs      Segmented % 69 %      Immature Grans % 0 %      Lymphocytes % 24 %      Monocytes % 6 %      Eosinophils Relative 1 %      Basophils Relative 0 %      Absolute Neutrophils 6.24 Thousands/µL      Absolute Immature Grans 0.02 Thousand/uL      Absolute Lymphocytes 2.12 Thousands/µL      Absolute Monocytes 0.54 Thousand/µL      Eosinophils Absolute 0.05 Thousand/µL      Basophils Absolute 0.03 Thousands/µL     POCT pregnancy, urine [[id number]]  (Normal) Resulted: 05/06/24 1438    Lab Status: Final result Updated: 05/06/24 1438     EXT Preg Test, Ur Negative     Control Valid                   US OB pregnancy limited with transvaginal   Final Result by Johnathon Zavala MD (05/06 7515)   No  intrauterine gestation or adnexal mass.   Differential remains early IUP, spontaneous  and ectopic pregnancy.  Correlate with serial quantitative BHCG.            Workstation performed: [id number]               Procedures  Procedures      ED Course  ED Course as of 24 1641   Mon May 06, 2024   1508 PREGNANCY TEST URINE: Negative  negative   1508 HCG QUANTITATIVE(!): 33.1   1513 Hemoglobin: 12.3   1513 Leukocytes, UA: Negative   1514 Nitrite, UA: Negative   1514 Blood, UA: Negative   1552 US OB pregnancy limited with transvaginal  FINDINGS:     UTERUS:  The uterus is anteverted in position, measuring 9.6 x 2.7 x 5.8 cm.  Contour and echotexture appear normal.  The cervix is closed and within normal limits.     ENDOMETRIUM:  Endometrial stripe measures 2.0 mm.  No evidence of intrauterine gestation.     OVARIES/ADNEXA:  No adnexal mass evident.  There is no free fluid.     Right ovary:  2.2 x 1.9 x 2.1 cm. Volume 4.7  Doppler flow present.  No suspicious abnormality.     Left ovary:  2.9 x 2.2 x 1.9 cm.  Volume  6.3  Doppler flow present.  No suspicious abnormality. Left corpus luteum.     IMPRESSION:  No intrauterine gestation or adnexal mass.  Differential remains early IUP, spontaneous  and ectopic pregnancy.  Correlate with serial quantitative BHCG.     1600 OB on call, Anahi Pena texted for reccs   1609 OB states if there is no hemorrhaging, vitals are stable, can follow-up with beta quant as an outpatient and follow-up in OB office in 1 to 2 weeks.   1623 Patient was updated about all labs and imaging and OB recommendations including need for repeat hCG in 48 hours, outpatient f/u in 1-2 weeks.  Questions and concerns were answered.  No symptoms of anemia.  Tolerating p.o.  Stable vital signs.  Patient already has established OB.  Will discharge.                                       Medical Decision Making  23-year-old female with vaginal bleeding after positive home UPT.    No  symptoms of anemia, stable vital signs.    Differentials including but not limited to: Implantation bleeding, threatened miscarriage, IUP, ectopic.     Will obtain labs, UA, transvaginal ultrasound.    UPT negative, beta quant mildly increased, TVUS with no IUP or adnexal mass, differential remains early IUP, spontaneous  and ectopic pregnancy.  Case was then discussed with OB on-call who recommended repeat outpatient beta quantitative hCG in 48 hours, OB follow-up in 1 to 2 weeks.  Patient was discharged home with outpatient follow-up, strict return precautions.    Amount and/or Complexity of Data Reviewed  Labs: ordered. Decision-making details documented in ED Course.  Radiology: ordered. Decision-making details documented in ED Course.          Disposition  Final diagnoses:   Vaginal bleeding   Threatened miscarriage in early pregnancy     Time reflects when diagnosis was documented in both MDM as applicable and the Disposition within this note       Time User Action Codes Description Comment    2024  4:23 PM Reema Maza [N93.9] Vaginal bleeding     2024  4:24 PM Reema Maza [O20.0] Threatened miscarriage in early pregnancy           ED Disposition       ED Disposition   Discharge    Condition   Stable    Date/Time   Mon May 6, 2024  4:23 PM    Comment   Ewa Oreilly discharge to home/self care.                   Follow-up Information       Follow up With Specialties Details Why Contact Info    Sarah Thomas MD Obstetrics and Gynecology, Obstetrics, Gynecology Schedule an appointment as soon as possible for a visit today for follow up [address] [facility] [address]  [phone]              Discharge Medication List as of 2024  4:25 PM        CONTINUE these medications which have NOT CHANGED    Details   Ascorbic Acid (vitamin C) 1000 MG tablet Take 1 tablet (1,000 mg total) by mouth daily for 14 days, Starting Sun 2020, Until Sun 2020, Normal       cholecalciferol (VITAMIN D3) 1,000 units tablet Take 1 tablet (1,000 Units total) by mouth daily for 14 days, Starting Sun 11/29/2020, Until Sun 12/13/2020, Normal      dicyclomine (BENTYL) 20 mg tablet Take 1 tablet (20 mg total) by mouth every 6 (six) hours as needed (abdominal cramping), Starting Wed 8/26/2020, Normal      docusate sodium (COLACE) 100 mg capsule Take 1 capsule (100 mg total) by mouth 2 (two) times a day, Starting Sun 11/22/2020, Normal      FLUoxetine (PROzac) 20 mg capsule Take 20 mg by mouth daily, Starting Thu 2/22/2024, Until Wed 5/22/2024, Historical Med      gabapentin (NEURONTIN) 100 mg capsule Take 100 mg by mouth 2 (two) times a day, Starting Thu 8/6/2020, Historical Med      lidocaine (XYLOCAINE) 5 % ointment Apply topically 2 (two) times a day as needed for mild pain or moderate pain, Starting Mon 4/1/2024, Normal      valACYclovir (VALTREX) 1,000 mg tablet Take 1 tablet (1,000 mg total) by mouth 2 (two) times a day for 5 days, Starting Tue 4/16/2024, Until Sun 4/21/2024, Normal      zinc sulfate (ZINCATE) 220 mg capsule Take 1 capsule (220 mg total) by mouth daily for 14 days, Starting Sun 11/29/2020, Until Sun 12/13/2020, Normal           Outpatient Discharge Orders   hCG, quantitative   Standing Status: Future Standing Exp. Date: 05/06/25       PDMP Review         Value Time User    PDMP Reviewed  Yes 11/28/2020 11:28 PM Jose May MD             ED Provider  Attending physically available and evaluated Ewa Oreilly. I managed the patient along with the ED Attending.    Electronically Signed by           Reema Maza MD  05/06/24 0380

## 2024-05-06 NOTE — TELEPHONE ENCOUNTER
"Incoming call from [facility], had regular menses 2 weeks ago. Started with spotting last night and heavier period type bleeding today.  Mild cramps. Negative HPT 2 weeks ago.   Menstrual cycle is always regular at 26-28 days.    Advised keep menstrual calendar.  Can take ibuprofen 600mg TID x 48 hours to slow bleeding.  Call back if bleeding continues 10 days or more, heavy bleeding-soaking through super/overnite pad less than an hour, 3 irregular cycles or any additional questions/concerns.  Will forward to Dr. Thomas for any additional recommendations.     Please advise if any additional recommendations.       Reason for Disposition   Normal menstrual flow     Intermenstrual bleeding x one occassion    Answer Assessment - Initial Assessment Questions  1. AMOUNT: \"Describe the bleeding that you are having.\"     - SPOTTING: spotting, or pinkish / brownish mucous discharge; does not fill panti-liner or pad     - MILD:  less than 1 pad / hour; less than patient's usual menstrual bleeding    - MODERATE: 1-2 pads / hour; 1 menstrual cup every 6 hours; small-medium blood clots (e.g., pea, grape, small coin)    - SEVERE: soaking 2 or more pads/hour for 2 or more hours; 1 menstrual cup every 2 hours; bleeding not contained by pads or continuous red blood from vagina; large blood clots (e.g., golf ball, large coin)       Changing-normal to super pad every 2 hours-3-4 dime size clots in the past 2 hours.  2. ONSET: \"When did the bleeding begin?\" \"Is it continuing now?\"      Last evening   3. MENSTRUAL PERIOD: \"When was the last normal menstrual period?\" \"How is this different than your period?\"      [facility] 4/23/2024  4. REGULARITY: \"How regular are your periods?\"      26-28 days consistnetly  5. ABDOMINAL PAIN: \"Do you have any pain?\" \"How bad is the pain?\"  (e.g., Scale 1-10; mild, moderate, or severe)    - MILD (1-3): doesn't interfere with normal activities, abdomen soft and not tender to touch     - MODERATE (4-7): interferes " "with normal activities or awakens from sleep, tender to touch     - SEVERE (8-10): excruciating pain, doubled over, unable to do any normal activities       Mild to moderate- Took 2 ibuprofen with little relief  6. PREGNANCY: \"Could you be pregnant?\" \"Are you sexually active?\" \"Did you recently give birth?\"  Negative HPT 2 weeks ago        7. BREASTFEEDING: \"Are you breastfeeding?\"      N/a  8. HORMONES: \"Are you taking any hormone medications, prescription or OTC?\" (e.g., birth control pills, estrogen)      N/A  9. BLOOD THINNERS: \"Do you take any blood thinners?\" (e.g., Coumadin/warfarin, Pradaxa/dabigatran, aspirin)      denies  10. CAUSE: \"What do you think is causing the bleeding?\" (e.g., recent gyn surgery, recent gyn procedure; known bleeding disorder, cervical cancer, polycystic ovarian disease, fibroids)          Unsure- has never happened before  11. HEMODYNAMIC STATUS: \"Are you weak or feeling lightheaded?\" If Yes, ask: \"Can you stand and walk normally?\"         denies  12. OTHER SYMPTOMS: \"What other symptoms are you having with the bleeding?\" (e.g., passed tissue, vaginal discharge, fever, menstrual-type cramps)        Mild-moderate cramps, clotting    Protocols used: Vaginal Bleeding - Abnormal-ADULT-[place]    "

## 2024-05-06 NOTE — DISCHARGE INSTRUCTIONS
As discussed, you will need a repeat hCG level in 48 hours, please go to an outpatient lab on 5/8/2024 to get this completed.  Follow-up with your OB in 1 to 2 weeks.  Return to the ED if any fever, foul-smelling vaginal discharge, chest pain, shortness of breath, feeling like you will pass out, vision changes, ab pain

## 2024-05-06 NOTE — TELEPHONE ENCOUNTER
Regarding: heavy bleeding/blood clots  ----- Message from Zaira Rutherford sent at 5/6/2024 10:20 AM EDT -----  Patient states she had her period 14 days ago, and just started bleeding again, it is heaving and having big clots. Patient states she took a pregnancy test prior to her LMP and it was negative, but her LMP was late by 4 days.    Tried warm transfer to [facility].

## 2024-05-06 NOTE — TELEPHONE ENCOUNTER
Patient calling back with positive HPT she took prior to call. Bleeding currently heavy, needing to change a pad every 2 hours. Pt states is passing dime-size clots. States has painful cramping about 6/10. Took ibuprofen 2 hours ago. RN advised can take Tylenol and rotate them to ensure pain control and coverage. RN advised that if bleeding becomes so heavy that she saturates a pad in <1 hour, cramping worsen despite OTC pain control, or she passes clots the size of a golf ball or egg or larger - call back, or go to ED if after hours. Advised that good chance since she had a positive HPT that she is experiencing a miscarriage. RN will notify doctor to see if would like follow up blood work. Staff will call patient with recommendations. Phone number in chart verified. Pt verbalized an understanding. No further questions at this time.

## 2024-05-06 NOTE — ED ATTENDING ATTESTATION
5/6/2024  IKatiuska DO, saw and evaluated the patient. I have discussed the patient with the resident/non-physician practitioner and agree with the resident's/non-physician practitioner's findings, Plan of Care, and MDM as documented in the resident's/non-physician practitioner's note, except where noted. All available labs and Radiology studies were reviewed.  I was present for key portions of any procedure(s) performed by the resident/non-physician practitioner and I was immediately available to provide assistance.       At this point I agree with the current assessment done in the Emergency Department.  I have conducted an independent evaluation of this patient a history and physical is as follows:    ED Course   23-year-old female presents to the emergency department for evaluation of vaginal bleeding and lower abdominal cramping.  Patient states that she has regular menstrual cycles lasting 2 to 3 days which are fairly light in the amount of bleeding.  She reports no menstrual period was April 23.  It lasted 3 days and resolved.  Patient started with bleeding and cramping again yesterday.  She took a home pregnancy test that was positive.  Patient states that she does not normally have breakthrough bleeding or midcycle bleeding.    Past medical history: Herpes simplex type I    Physical exam: Patient is awake and alert, no acute distress.  Resting comfortably.  Pupils are equal and reactive.  Neck is supple without JVD.  Heart is regular rate and rhythm without ectopy.  Lungs are clear to auscultation.  Chest wall is nontender to palpation.  Abdomen is soft, nontender, nondistended with normal active bowel sounds.  No lower extremity edema.  No focal motor or sensory deficits.  Speech is clear and appropriate  Assessment/plan: 23-year-old female presents with irregular vaginal bleeding differential diagnosis includes but is not limited to acute miscarriage, irregular menses secondary to hormonal changes,  ectopic pregnancy    Will check urine beta-hCG and pelvic ultrasound,    Update: Patient's urine pregnancy test is negative, blood beta-hCG quant pending and ultrasound assisted completed at the time of the negative test.  Will complete workup and reevaluate.        Critical Care Time  Procedures

## 2024-05-06 NOTE — TELEPHONE ENCOUNTER
Called to check in    Had a positive pregnancy test at home  Last period 2 weeks ago  Changing pad every 2 hours and its fully saturated  Not lightheaded or dizzy    She is currently in the [place] emergency room waiting room

## 2024-05-08 ENCOUNTER — APPOINTMENT (OUTPATIENT)
Dept: LAB | Facility: CLINIC | Age: 24
End: 2024-05-08

## 2024-05-08 DIAGNOSIS — O20.0 THREATENED MISCARRIAGE IN EARLY PREGNANCY: ICD-10-CM

## 2024-05-08 DIAGNOSIS — N93.9 VAGINAL BLEEDING: ICD-10-CM

## 2024-05-08 LAB
B-HCG SERPL-ACNC: 17.8 MIU/ML (ref 0–5)
BACTERIA UR CULT: NORMAL

## 2024-05-08 PROCEDURE — 36415 COLL VENOUS BLD VENIPUNCTURE: CPT

## 2024-05-08 PROCEDURE — 84702 CHORIONIC GONADOTROPIN TEST: CPT

## 2024-05-10 NOTE — TELEPHONE ENCOUNTER
Placed follow up call to patient. Call went straight to voicemail. RN left a generic message to call back and check in with nurses.

## 2024-05-14 NOTE — TELEPHONE ENCOUNTER
Placed call to patient, left a non detailed message to return our call. Third attempt at contacting patient. Lively message sent to patient. Sent to office clinical staff and/or provider to make aware.

## 2024-06-17 DIAGNOSIS — N90.89 VULVAR LESION: ICD-10-CM

## 2024-06-18 RX ORDER — VALACYCLOVIR HYDROCHLORIDE 1 G/1
1000 TABLET, FILM COATED ORAL 2 TIMES DAILY
Qty: 10 TABLET | Refills: 5 | Status: SHIPPED | OUTPATIENT
Start: 2024-06-18 | End: 2024-07-18

## 2024-08-19 ENCOUNTER — TELEPHONE (OUTPATIENT)
Dept: OBGYN CLINIC | Facility: CLINIC | Age: 24
End: 2024-08-19

## 2024-08-20 NOTE — TELEPHONE ENCOUNTER
Lmom for pt to call us back to get missed appointment with Dr. Thomas rescheduled. Will also send [facility] message

## 2024-09-30 ENCOUNTER — TELEPHONE (OUTPATIENT)
Age: 24
End: 2024-09-30

## 2024-09-30 NOTE — TELEPHONE ENCOUNTER
Called pt to make aware [facility] will call once sooner appt for colpo is available. If pt can be seen by a different provider and location will help for sooner appt.

## 2024-10-01 ENCOUNTER — PROCEDURE VISIT (OUTPATIENT)
Dept: OBGYN CLINIC | Facility: CLINIC | Age: 24
End: 2024-10-01
Payer: COMMERCIAL

## 2024-10-01 VITALS
SYSTOLIC BLOOD PRESSURE: 120 MMHG | WEIGHT: 135 LBS | DIASTOLIC BLOOD PRESSURE: 76 MMHG | HEIGHT: 62 IN | BODY MASS INDEX: 24.84 KG/M2

## 2024-10-01 DIAGNOSIS — R87.810 ASCUS WITH POSITIVE HIGH RISK HPV CERVICAL: Primary | ICD-10-CM

## 2024-10-01 DIAGNOSIS — Z32.02 PREGNANCY TEST NEGATIVE: ICD-10-CM

## 2024-10-01 DIAGNOSIS — R87.610 ASCUS WITH POSITIVE HIGH RISK HPV CERVICAL: Primary | ICD-10-CM

## 2024-10-01 DIAGNOSIS — N92.6 MENSTRUAL CHANGES: ICD-10-CM

## 2024-10-01 DIAGNOSIS — Z31.69 ENCOUNTER FOR PRECONCEPTION CONSULTATION: ICD-10-CM

## 2024-10-01 LAB — SL AMB POCT URINE HCG: NEGATIVE

## 2024-10-01 PROCEDURE — 81025 URINE PREGNANCY TEST: CPT | Performed by: STUDENT IN AN ORGANIZED HEALTH CARE EDUCATION/TRAINING PROGRAM

## 2024-10-01 PROCEDURE — G0124 SCREEN C/V THIN LAYER BY MD: HCPCS | Performed by: PATHOLOGY

## 2024-10-01 PROCEDURE — G0145 SCR C/V CYTO,THINLAYER,RESCR: HCPCS | Performed by: PATHOLOGY

## 2024-10-01 PROCEDURE — 88305 TISSUE EXAM BY PATHOLOGIST: CPT | Performed by: PATHOLOGY

## 2024-10-01 PROCEDURE — 57456 ENDOCERV CURETTAGE W/SCOPE: CPT | Performed by: STUDENT IN AN ORGANIZED HEALTH CARE EDUCATION/TRAINING PROGRAM

## 2024-10-01 NOTE — PROGRESS NOTES
Pap 04/01/2024L [facility], HPV+  Colpo scheduled 06/11/24--missed  Tried to reach multiple times to reschedule    Colposcopy    Date/Time: 10/1/2024 4:30 PM    Performed by: Sarah Thomas MD  Authorized by: Sarah Thomas MD    Other Assisting Provider: Yes (comment)    Verbal consent obtained?: Yes    Risks and benefits: Risks, benefits and alternatives were discussed    Consent given by:  Patient  Time Out:     Time out: Immediately prior to the procedure a time out was called    Patient states understanding of procedure being performed: Yes    Relevant documents present and verified: Yes    Test results available and properly labeled: Yes    Required items: Required blood products, implants, devices and special equipment available    Patient identity confirmed:  Verbally with patient and provided demographic data  Pre-procedure:     Prepped with: acetic acid    Indication:     Indication:  ASC-US  Procedure:     Procedure: Colposcopy w/ endocervical curettage      [place] speculum was placed in the vagina: yes      Under colposcopic examination the transition zone was seen in entirety: no      Endocervix was curetted using a Kevorkian curette: yes      Monsel's solution was applied: no      Specimen(s) to pathology: yes    Post-procedure:     Impression: Low grade cervical dysplasia      Patient tolerance of procedure:  Tolerated well, no immediate complications  Comments:      Reviewed pelvic rest for a few days  Call if any fevers, severe pain, heavy bleeding  If benign or low grade, repeat pap in 1 year  If high grade, will recommend LEEP versus serial colpo    Has been trying to conceive, timed IC, cycles regular but shorter only 2-3 days  Will check day 21 E2/P to assess ovulation  RTO for fertility discussion    All questions answered to the best of my ability.

## 2024-10-07 LAB
LAB AP GYN PRIMARY INTERPRETATION: NORMAL
LAB AP LMP: NORMAL
Lab: NORMAL
PATH INTERP SPEC-IMP: NORMAL

## 2024-10-08 PROCEDURE — 88305 TISSUE EXAM BY PATHOLOGIST: CPT | Performed by: PATHOLOGY

## 2024-12-10 DIAGNOSIS — Z00.6 ENCOUNTER FOR EXAMINATION FOR NORMAL COMPARISON OR CONTROL IN CLINICAL RESEARCH PROGRAM: ICD-10-CM
